# Patient Record
Sex: FEMALE | Race: BLACK OR AFRICAN AMERICAN | Employment: FULL TIME | URBAN - METROPOLITAN AREA
[De-identification: names, ages, dates, MRNs, and addresses within clinical notes are randomized per-mention and may not be internally consistent; named-entity substitution may affect disease eponyms.]

---

## 2018-02-16 ENCOUNTER — APPOINTMENT (OUTPATIENT)
Dept: GENERAL RADIOLOGY | Age: 48
DRG: 191 | End: 2018-02-16
Payer: COMMERCIAL

## 2018-02-16 ENCOUNTER — HOSPITAL ENCOUNTER (INPATIENT)
Age: 48
LOS: 4 days | Discharge: HOME | DRG: 191 | End: 2018-02-20
Attending: EMERGENCY MEDICINE | Admitting: INTERNAL MEDICINE
Payer: COMMERCIAL

## 2018-02-16 DIAGNOSIS — J44.1 COPD EXACERBATION (HCC): ICD-10-CM

## 2018-02-16 DIAGNOSIS — J18.9 PNEUMONIA DUE TO ORGANISM: Primary | ICD-10-CM

## 2018-02-16 LAB
% CKMB: 1.3 % (ref 0–3)
ABSOLUTE EOS #: 0 K/UL (ref 0–0.4)
ABSOLUTE IMMATURE GRANULOCYTE: ABNORMAL K/UL (ref 0–0.3)
ABSOLUTE LYMPH #: 0.4 K/UL (ref 1–4.8)
ABSOLUTE MONO #: 0.8 K/UL (ref 0.2–0.8)
ANION GAP SERPL CALCULATED.3IONS-SCNC: 15 MMOL/L (ref 9–17)
BASOPHILS # BLD: 0 % (ref 0–2)
BASOPHILS ABSOLUTE: 0 K/UL (ref 0–0.2)
BUN BLDV-MCNC: 11 MG/DL (ref 6–20)
BUN/CREAT BLD: 17 (ref 9–20)
CALCIUM SERPL-MCNC: 9 MG/DL (ref 8.6–10.4)
CHLORIDE BLD-SCNC: 97 MMOL/L (ref 98–107)
CK MB: 1.9 NG/ML
CKMB INTERPRETATION: NORMAL
CO2: 24 MMOL/L (ref 20–31)
CREAT SERPL-MCNC: 0.64 MG/DL (ref 0.5–0.9)
DIFFERENTIAL TYPE: ABNORMAL
DIRECT EXAM: NORMAL
EKG ATRIAL RATE: 133 BPM
EKG P AXIS: 25 DEGREES
EKG P-R INTERVAL: 104 MS
EKG Q-T INTERVAL: 390 MS
EKG QRS DURATION: 84 MS
EKG QTC CALCULATION (BAZETT): 580 MS
EKG R AXIS: 57 DEGREES
EKG T AXIS: 12 DEGREES
EKG VENTRICULAR RATE: 133 BPM
EOSINOPHILS RELATIVE PERCENT: 0 % (ref 1–4)
GFR AFRICAN AMERICAN: >60 ML/MIN
GFR NON-AFRICAN AMERICAN: >60 ML/MIN
GFR SERPL CREATININE-BSD FRML MDRD: ABNORMAL ML/MIN/{1.73_M2}
GFR SERPL CREATININE-BSD FRML MDRD: ABNORMAL ML/MIN/{1.73_M2}
GLUCOSE BLD-MCNC: 101 MG/DL (ref 70–99)
HCT VFR BLD CALC: 45.2 % (ref 36–46)
HEMOGLOBIN: 15 G/DL (ref 12–16)
IMMATURE GRANULOCYTES: ABNORMAL %
LACTIC ACID: 1.3 MMOL/L (ref 0.5–2.2)
LYMPHOCYTES # BLD: 4 % (ref 24–44)
Lab: NORMAL
MAGNESIUM: 1.8 MG/DL (ref 1.6–2.6)
MCH RBC QN AUTO: 31.5 PG (ref 26–34)
MCHC RBC AUTO-ENTMCNC: 33.2 G/DL (ref 31–37)
MCV RBC AUTO: 94.8 FL (ref 80–100)
MONOCYTES # BLD: 8 % (ref 1–7)
MYOGLOBIN: 21 NG/ML (ref 25–58)
NRBC AUTOMATED: ABNORMAL PER 100 WBC
PDW BLD-RTO: 13.3 % (ref 11.5–14.5)
PLATELET # BLD: 324 K/UL (ref 130–400)
PLATELET ESTIMATE: ABNORMAL
PMV BLD AUTO: 8.5 FL (ref 6–12)
POC TROPONIN I: 0 NG/ML (ref 0–0.1)
POC TROPONIN INTERP: NORMAL
POTASSIUM SERPL-SCNC: 3.1 MMOL/L (ref 3.7–5.3)
RBC # BLD: 4.77 M/UL (ref 4–5.2)
RBC # BLD: ABNORMAL 10*6/UL
SEG NEUTROPHILS: 88 % (ref 36–66)
SEGMENTED NEUTROPHILS ABSOLUTE COUNT: 8.2 K/UL (ref 1.8–7.7)
SODIUM BLD-SCNC: 136 MMOL/L (ref 135–144)
SPECIMEN DESCRIPTION: NORMAL
STATUS: NORMAL
TOTAL CK: 144 U/L (ref 26–192)
TROPONIN INTERP: ABNORMAL
TROPONIN T: <0.03 NG/ML
WBC # BLD: 9.5 K/UL (ref 3.5–11)
WBC # BLD: ABNORMAL 10*3/UL

## 2018-02-16 PROCEDURE — 6370000000 HC RX 637 (ALT 250 FOR IP): Performed by: EMERGENCY MEDICINE

## 2018-02-16 PROCEDURE — 82550 ASSAY OF CK (CPK): CPT

## 2018-02-16 PROCEDURE — 96376 TX/PRO/DX INJ SAME DRUG ADON: CPT

## 2018-02-16 PROCEDURE — 83874 ASSAY OF MYOGLOBIN: CPT

## 2018-02-16 PROCEDURE — 93005 ELECTROCARDIOGRAM TRACING: CPT

## 2018-02-16 PROCEDURE — 87086 URINE CULTURE/COLONY COUNT: CPT

## 2018-02-16 PROCEDURE — 85025 COMPLETE CBC W/AUTO DIFF WBC: CPT

## 2018-02-16 PROCEDURE — 6360000002 HC RX W HCPCS: Performed by: EMERGENCY MEDICINE

## 2018-02-16 PROCEDURE — 83605 ASSAY OF LACTIC ACID: CPT

## 2018-02-16 PROCEDURE — 2500000003 HC RX 250 WO HCPCS: Performed by: EMERGENCY MEDICINE

## 2018-02-16 PROCEDURE — 84484 ASSAY OF TROPONIN QUANT: CPT

## 2018-02-16 PROCEDURE — 83735 ASSAY OF MAGNESIUM: CPT

## 2018-02-16 PROCEDURE — 82553 CREATINE MB FRACTION: CPT

## 2018-02-16 PROCEDURE — 94640 AIRWAY INHALATION TREATMENT: CPT

## 2018-02-16 PROCEDURE — 2060000000 HC ICU INTERMEDIATE R&B

## 2018-02-16 PROCEDURE — 71045 X-RAY EXAM CHEST 1 VIEW: CPT

## 2018-02-16 PROCEDURE — 80048 BASIC METABOLIC PNL TOTAL CA: CPT

## 2018-02-16 PROCEDURE — 96374 THER/PROPH/DIAG INJ IV PUSH: CPT

## 2018-02-16 PROCEDURE — 96375 TX/PRO/DX INJ NEW DRUG ADDON: CPT

## 2018-02-16 PROCEDURE — 2580000003 HC RX 258: Performed by: EMERGENCY MEDICINE

## 2018-02-16 PROCEDURE — 87040 BLOOD CULTURE FOR BACTERIA: CPT

## 2018-02-16 PROCEDURE — 99285 EMERGENCY DEPT VISIT HI MDM: CPT

## 2018-02-16 PROCEDURE — 87804 INFLUENZA ASSAY W/OPTIC: CPT

## 2018-02-16 RX ORDER — ONDANSETRON 2 MG/ML
4 INJECTION INTRAMUSCULAR; INTRAVENOUS ONCE
Status: COMPLETED | OUTPATIENT
Start: 2018-02-16 | End: 2018-02-16

## 2018-02-16 RX ORDER — 0.9 % SODIUM CHLORIDE 0.9 %
1000 INTRAVENOUS SOLUTION INTRAVENOUS ONCE
Status: COMPLETED | OUTPATIENT
Start: 2018-02-16 | End: 2018-02-16

## 2018-02-16 RX ORDER — RIZATRIPTAN BENZOATE 10 MG/1
10 TABLET ORAL
COMMUNITY

## 2018-02-16 RX ORDER — HYDROCHLOROTHIAZIDE 25 MG/1
25 TABLET ORAL DAILY
COMMUNITY
End: 2022-02-07

## 2018-02-16 RX ORDER — PHENTERMINE HYDROCHLORIDE 37.5 MG/1
37.5 CAPSULE ORAL EVERY MORNING
Status: ON HOLD | COMMUNITY
End: 2018-02-17

## 2018-02-16 RX ORDER — SODIUM CHLORIDE 9 MG/ML
INJECTION, SOLUTION INTRAVENOUS CONTINUOUS
Status: DISCONTINUED | OUTPATIENT
Start: 2018-02-16 | End: 2018-02-17 | Stop reason: DRUGHIGH

## 2018-02-16 RX ORDER — ACETAMINOPHEN 160 MG/5ML
650 SOLUTION ORAL ONCE
Status: COMPLETED | OUTPATIENT
Start: 2018-02-16 | End: 2018-02-16

## 2018-02-16 RX ORDER — METHYLPREDNISOLONE SODIUM SUCCINATE 125 MG/2ML
125 INJECTION, POWDER, LYOPHILIZED, FOR SOLUTION INTRAMUSCULAR; INTRAVENOUS ONCE
Status: COMPLETED | OUTPATIENT
Start: 2018-02-16 | End: 2018-02-16

## 2018-02-16 RX ORDER — LEVALBUTEROL 1.25 MG/.5ML
1.25 SOLUTION, CONCENTRATE RESPIRATORY (INHALATION) ONCE
Status: COMPLETED | OUTPATIENT
Start: 2018-02-16 | End: 2018-02-16

## 2018-02-16 RX ORDER — METOPROLOL SUCCINATE 25 MG/1
25 TABLET, EXTENDED RELEASE ORAL DAILY
COMMUNITY
End: 2022-02-07

## 2018-02-16 RX ORDER — SODIUM CHLORIDE FOR INHALATION 0.9 %
3 VIAL, NEBULIZER (ML) INHALATION ONCE
Status: DISCONTINUED | OUTPATIENT
Start: 2018-02-16 | End: 2018-02-20 | Stop reason: HOSPADM

## 2018-02-16 RX ORDER — METOPROLOL TARTRATE 5 MG/5ML
2.5 INJECTION INTRAVENOUS ONCE
Status: COMPLETED | OUTPATIENT
Start: 2018-02-16 | End: 2018-02-16

## 2018-02-16 RX ORDER — POTASSIUM BICARBONATE 25 MEQ/1
25 TABLET, EFFERVESCENT ORAL ONCE
Status: COMPLETED | OUTPATIENT
Start: 2018-02-16 | End: 2018-02-16

## 2018-02-16 RX ORDER — MORPHINE SULFATE 4 MG/ML
4 INJECTION, SOLUTION INTRAMUSCULAR; INTRAVENOUS ONCE
Status: COMPLETED | OUTPATIENT
Start: 2018-02-16 | End: 2018-02-16

## 2018-02-16 RX ORDER — MORPHINE SULFATE 2 MG/ML
2 INJECTION, SOLUTION INTRAMUSCULAR; INTRAVENOUS ONCE
Status: COMPLETED | OUTPATIENT
Start: 2018-02-16 | End: 2018-02-16

## 2018-02-16 RX ADMIN — METOROPROLOL TARTRATE 2.5 MG: 5 INJECTION, SOLUTION INTRAVENOUS at 19:35

## 2018-02-16 RX ADMIN — ACETAMINOPHEN 650 MG: 325 SOLUTION ORAL at 19:32

## 2018-02-16 RX ADMIN — SODIUM CHLORIDE 1000 ML: 9 INJECTION, SOLUTION INTRAVENOUS at 19:29

## 2018-02-16 RX ADMIN — IPRATROPIUM BROMIDE 0.5 MG: 0.5 SOLUTION RESPIRATORY (INHALATION) at 19:44

## 2018-02-16 RX ADMIN — CEFTRIAXONE SODIUM 1 G: 10 INJECTION, POWDER, FOR SOLUTION INTRAVENOUS at 21:27

## 2018-02-16 RX ADMIN — POTASSIUM BICARBONATE 25 MEQ: 25 TABLET, EFFERVESCENT ORAL at 21:17

## 2018-02-16 RX ADMIN — LEVALBUTEROL 1.25 MG: 1.25 SOLUTION, CONCENTRATE RESPIRATORY (INHALATION) at 19:44

## 2018-02-16 RX ADMIN — SODIUM CHLORIDE: 9 INJECTION, SOLUTION INTRAVENOUS at 19:30

## 2018-02-16 RX ADMIN — MORPHINE SULFATE 4 MG: 4 INJECTION INTRAVENOUS at 19:43

## 2018-02-16 RX ADMIN — AZITHROMYCIN MONOHYDRATE 500 MG: 500 INJECTION, POWDER, LYOPHILIZED, FOR SOLUTION INTRAVENOUS at 21:58

## 2018-02-16 RX ADMIN — METHYLPREDNISOLONE SODIUM SUCCINATE 125 MG: 125 INJECTION, POWDER, FOR SOLUTION INTRAMUSCULAR; INTRAVENOUS at 21:17

## 2018-02-16 RX ADMIN — ONDANSETRON 4 MG: 2 INJECTION INTRAMUSCULAR; INTRAVENOUS at 19:32

## 2018-02-16 RX ADMIN — MORPHINE SULFATE 2 MG: 2 INJECTION, SOLUTION INTRAMUSCULAR; INTRAVENOUS at 21:18

## 2018-02-16 ASSESSMENT — ENCOUNTER SYMPTOMS
NAUSEA: 1
BACK PAIN: 1
CHEST TIGHTNESS: 1
SHORTNESS OF BREATH: 1
WHEEZING: 1
COUGH: 1

## 2018-02-16 ASSESSMENT — PAIN DESCRIPTION - PAIN TYPE
TYPE: ACUTE PAIN

## 2018-02-16 ASSESSMENT — PAIN SCALES - GENERAL
PAINLEVEL_OUTOF10: 10
PAINLEVEL_OUTOF10: 2
PAINLEVEL_OUTOF10: 5
PAINLEVEL_OUTOF10: 7

## 2018-02-16 ASSESSMENT — PAIN DESCRIPTION - LOCATION
LOCATION: BACK;LEG
LOCATION: BACK;LEG
LOCATION: BACK;EAR

## 2018-02-17 ENCOUNTER — APPOINTMENT (OUTPATIENT)
Dept: GENERAL RADIOLOGY | Age: 48
DRG: 191 | End: 2018-02-17
Payer: COMMERCIAL

## 2018-02-17 PROBLEM — E87.6 HYPOKALEMIA: Status: ACTIVE | Noted: 2018-02-17

## 2018-02-17 LAB
ABSOLUTE EOS #: 0 K/UL (ref 0–0.4)
ABSOLUTE IMMATURE GRANULOCYTE: ABNORMAL K/UL (ref 0–0.3)
ABSOLUTE LYMPH #: 0.4 K/UL (ref 1–4.8)
ABSOLUTE MONO #: 0.1 K/UL (ref 0.2–0.8)
ANION GAP SERPL CALCULATED.3IONS-SCNC: 12 MMOL/L (ref 9–17)
BASOPHILS # BLD: 0 % (ref 0–2)
BASOPHILS ABSOLUTE: 0 K/UL (ref 0–0.2)
BUN BLDV-MCNC: 8 MG/DL (ref 6–20)
BUN/CREAT BLD: 13 (ref 9–20)
CALCIUM SERPL-MCNC: 8.6 MG/DL (ref 8.6–10.4)
CHLORIDE BLD-SCNC: 100 MMOL/L (ref 98–107)
CO2: 26 MMOL/L (ref 20–31)
CREAT SERPL-MCNC: 0.61 MG/DL (ref 0.5–0.9)
DIFFERENTIAL TYPE: ABNORMAL
EOSINOPHILS RELATIVE PERCENT: 0 % (ref 1–4)
GFR AFRICAN AMERICAN: >60 ML/MIN
GFR NON-AFRICAN AMERICAN: >60 ML/MIN
GFR SERPL CREATININE-BSD FRML MDRD: ABNORMAL ML/MIN/{1.73_M2}
GFR SERPL CREATININE-BSD FRML MDRD: ABNORMAL ML/MIN/{1.73_M2}
GLUCOSE BLD-MCNC: 129 MG/DL (ref 65–105)
GLUCOSE BLD-MCNC: 137 MG/DL (ref 70–99)
HCT VFR BLD CALC: 43 % (ref 36–46)
HEMOGLOBIN: 14.1 G/DL (ref 12–16)
IMMATURE GRANULOCYTES: ABNORMAL %
LYMPHOCYTES # BLD: 6 % (ref 24–44)
MAGNESIUM: 1.9 MG/DL (ref 1.6–2.6)
MCH RBC QN AUTO: 31.6 PG (ref 26–34)
MCHC RBC AUTO-ENTMCNC: 32.7 G/DL (ref 31–37)
MCV RBC AUTO: 96.5 FL (ref 80–100)
MONOCYTES # BLD: 1 % (ref 1–7)
NRBC AUTOMATED: ABNORMAL PER 100 WBC
PDW BLD-RTO: 13.4 % (ref 11.5–14.5)
PLATELET # BLD: 286 K/UL (ref 130–400)
PLATELET ESTIMATE: ABNORMAL
PMV BLD AUTO: 8.5 FL (ref 6–12)
POTASSIUM SERPL-SCNC: 3.5 MMOL/L (ref 3.7–5.3)
RBC # BLD: 4.45 M/UL (ref 4–5.2)
RBC # BLD: ABNORMAL 10*6/UL
SEG NEUTROPHILS: 93 % (ref 36–66)
SEGMENTED NEUTROPHILS ABSOLUTE COUNT: 6.2 K/UL (ref 1.8–7.7)
SODIUM BLD-SCNC: 138 MMOL/L (ref 135–144)
WBC # BLD: 6.8 K/UL (ref 3.5–11)
WBC # BLD: ABNORMAL 10*3/UL

## 2018-02-17 PROCEDURE — 94760 N-INVAS EAR/PLS OXIMETRY 1: CPT

## 2018-02-17 PROCEDURE — 2500000003 HC RX 250 WO HCPCS: Performed by: INTERNAL MEDICINE

## 2018-02-17 PROCEDURE — 2700000000 HC OXYGEN THERAPY PER DAY

## 2018-02-17 PROCEDURE — 6360000002 HC RX W HCPCS: Performed by: INTERNAL MEDICINE

## 2018-02-17 PROCEDURE — 82947 ASSAY GLUCOSE BLOOD QUANT: CPT

## 2018-02-17 PROCEDURE — 80048 BASIC METABOLIC PNL TOTAL CA: CPT

## 2018-02-17 PROCEDURE — 36415 COLL VENOUS BLD VENIPUNCTURE: CPT

## 2018-02-17 PROCEDURE — 85025 COMPLETE CBC W/AUTO DIFF WBC: CPT

## 2018-02-17 PROCEDURE — 6370000000 HC RX 637 (ALT 250 FOR IP): Performed by: NURSE PRACTITIONER

## 2018-02-17 PROCEDURE — 94640 AIRWAY INHALATION TREATMENT: CPT

## 2018-02-17 PROCEDURE — 83036 HEMOGLOBIN GLYCOSYLATED A1C: CPT

## 2018-02-17 PROCEDURE — 2060000000 HC ICU INTERMEDIATE R&B

## 2018-02-17 PROCEDURE — S0028 INJECTION, FAMOTIDINE, 20 MG: HCPCS | Performed by: INTERNAL MEDICINE

## 2018-02-17 PROCEDURE — 71045 X-RAY EXAM CHEST 1 VIEW: CPT

## 2018-02-17 PROCEDURE — 6370000000 HC RX 637 (ALT 250 FOR IP): Performed by: INTERNAL MEDICINE

## 2018-02-17 PROCEDURE — 83735 ASSAY OF MAGNESIUM: CPT

## 2018-02-17 PROCEDURE — 2580000003 HC RX 258: Performed by: INTERNAL MEDICINE

## 2018-02-17 RX ORDER — ALBUTEROL SULFATE 2.5 MG/3ML
2.5 SOLUTION RESPIRATORY (INHALATION)
Status: DISCONTINUED | OUTPATIENT
Start: 2018-02-17 | End: 2018-02-20 | Stop reason: HOSPADM

## 2018-02-17 RX ORDER — NICOTINE POLACRILEX 4 MG
15 LOZENGE BUCCAL PRN
Status: DISCONTINUED | OUTPATIENT
Start: 2018-02-17 | End: 2018-02-20 | Stop reason: HOSPADM

## 2018-02-17 RX ORDER — PHENTERMINE HYDROCHLORIDE 37.5 MG/1
37.5 TABLET ORAL
COMMUNITY
End: 2022-02-07

## 2018-02-17 RX ORDER — HYDROCHLOROTHIAZIDE 25 MG/1
25 TABLET ORAL DAILY
Status: DISCONTINUED | OUTPATIENT
Start: 2018-02-17 | End: 2018-02-20 | Stop reason: HOSPADM

## 2018-02-17 RX ORDER — SODIUM CHLORIDE 9 MG/ML
INJECTION, SOLUTION INTRAVENOUS CONTINUOUS
Status: DISCONTINUED | OUTPATIENT
Start: 2018-02-17 | End: 2018-02-18

## 2018-02-17 RX ORDER — LORAZEPAM 2 MG/ML
1 INJECTION INTRAMUSCULAR EVERY 6 HOURS PRN
Status: DISCONTINUED | OUTPATIENT
Start: 2018-02-17 | End: 2018-02-20 | Stop reason: HOSPADM

## 2018-02-17 RX ORDER — MAGNESIUM SULFATE 1 G/100ML
1 INJECTION INTRAVENOUS PRN
Status: DISCONTINUED | OUTPATIENT
Start: 2018-02-17 | End: 2018-02-20 | Stop reason: HOSPADM

## 2018-02-17 RX ORDER — METHYLPREDNISOLONE SODIUM SUCCINATE 40 MG/ML
40 INJECTION, POWDER, LYOPHILIZED, FOR SOLUTION INTRAMUSCULAR; INTRAVENOUS EVERY 6 HOURS
Status: DISCONTINUED | OUTPATIENT
Start: 2018-02-17 | End: 2018-02-18

## 2018-02-17 RX ORDER — SODIUM CHLORIDE 0.9 % (FLUSH) 0.9 %
10 SYRINGE (ML) INJECTION PRN
Status: DISCONTINUED | OUTPATIENT
Start: 2018-02-17 | End: 2018-02-20 | Stop reason: HOSPADM

## 2018-02-17 RX ORDER — OMEPRAZOLE 20 MG/1
20 CAPSULE, DELAYED RELEASE ORAL DAILY
COMMUNITY

## 2018-02-17 RX ORDER — ACETAMINOPHEN 325 MG/1
650 TABLET ORAL EVERY 4 HOURS PRN
Status: DISCONTINUED | OUTPATIENT
Start: 2018-02-17 | End: 2018-02-20 | Stop reason: HOSPADM

## 2018-02-17 RX ORDER — POTASSIUM CHLORIDE 7.45 MG/ML
10 INJECTION INTRAVENOUS PRN
Status: DISCONTINUED | OUTPATIENT
Start: 2018-02-17 | End: 2018-02-20 | Stop reason: HOSPADM

## 2018-02-17 RX ORDER — SODIUM CHLORIDE 0.9 % (FLUSH) 0.9 %
10 SYRINGE (ML) INJECTION EVERY 12 HOURS SCHEDULED
Status: DISCONTINUED | OUTPATIENT
Start: 2018-02-17 | End: 2018-02-20 | Stop reason: HOSPADM

## 2018-02-17 RX ORDER — FLUTICASONE PROPIONATE 50 MCG
1 SPRAY, SUSPENSION (ML) NASAL DAILY
Status: DISCONTINUED | OUTPATIENT
Start: 2018-02-17 | End: 2018-02-20 | Stop reason: HOSPADM

## 2018-02-17 RX ORDER — LEVALBUTEROL 1.25 MG/.5ML
1.25 SOLUTION, CONCENTRATE RESPIRATORY (INHALATION) 4 TIMES DAILY
Status: DISCONTINUED | OUTPATIENT
Start: 2018-02-17 | End: 2018-02-20 | Stop reason: HOSPADM

## 2018-02-17 RX ORDER — MORPHINE SULFATE 2 MG/ML
2 INJECTION, SOLUTION INTRAMUSCULAR; INTRAVENOUS
Status: DISCONTINUED | OUTPATIENT
Start: 2018-02-17 | End: 2018-02-20 | Stop reason: HOSPADM

## 2018-02-17 RX ORDER — DEXTROSE MONOHYDRATE 50 MG/ML
100 INJECTION, SOLUTION INTRAVENOUS PRN
Status: DISCONTINUED | OUTPATIENT
Start: 2018-02-17 | End: 2018-02-20 | Stop reason: HOSPADM

## 2018-02-17 RX ORDER — SODIUM CHLORIDE FOR INHALATION 0.9 %
3 VIAL, NEBULIZER (ML) INHALATION 4 TIMES DAILY
Status: DISCONTINUED | OUTPATIENT
Start: 2018-02-17 | End: 2018-02-18

## 2018-02-17 RX ORDER — DEXTROSE MONOHYDRATE 25 G/50ML
12.5 INJECTION, SOLUTION INTRAVENOUS PRN
Status: DISCONTINUED | OUTPATIENT
Start: 2018-02-17 | End: 2018-02-20 | Stop reason: HOSPADM

## 2018-02-17 RX ORDER — MORPHINE SULFATE 4 MG/ML
4 INJECTION, SOLUTION INTRAMUSCULAR; INTRAVENOUS
Status: DISCONTINUED | OUTPATIENT
Start: 2018-02-17 | End: 2018-02-20 | Stop reason: HOSPADM

## 2018-02-17 RX ORDER — POTASSIUM CHLORIDE 20 MEQ/1
20 TABLET, EXTENDED RELEASE ORAL ONCE
Status: COMPLETED | OUTPATIENT
Start: 2018-02-17 | End: 2018-02-17

## 2018-02-17 RX ORDER — METOPROLOL SUCCINATE 25 MG/1
25 TABLET, EXTENDED RELEASE ORAL DAILY
Status: DISCONTINUED | OUTPATIENT
Start: 2018-02-17 | End: 2018-02-20 | Stop reason: HOSPADM

## 2018-02-17 RX ORDER — ONDANSETRON 2 MG/ML
4 INJECTION INTRAMUSCULAR; INTRAVENOUS EVERY 6 HOURS PRN
Status: DISCONTINUED | OUTPATIENT
Start: 2018-02-17 | End: 2018-02-20 | Stop reason: HOSPADM

## 2018-02-17 RX ORDER — METHYLPREDNISOLONE SODIUM SUCCINATE 125 MG/2ML
80 INJECTION, POWDER, LYOPHILIZED, FOR SOLUTION INTRAMUSCULAR; INTRAVENOUS EVERY 6 HOURS
Status: DISCONTINUED | OUTPATIENT
Start: 2018-02-17 | End: 2018-02-17

## 2018-02-17 RX ADMIN — LEVALBUTEROL 1.25 MG: 1.25 SOLUTION, CONCENTRATE RESPIRATORY (INHALATION) at 19:49

## 2018-02-17 RX ADMIN — POTASSIUM CHLORIDE 20 MEQ: 20 TABLET, EXTENDED RELEASE ORAL at 20:33

## 2018-02-17 RX ADMIN — IPRATROPIUM BROMIDE 0.5 MG: 0.5 SOLUTION RESPIRATORY (INHALATION) at 19:49

## 2018-02-17 RX ADMIN — METHYLPREDNISOLONE SODIUM SUCCINATE 40 MG: 40 INJECTION, POWDER, FOR SOLUTION INTRAMUSCULAR; INTRAVENOUS at 15:19

## 2018-02-17 RX ADMIN — FAMOTIDINE 20 MG: 10 INJECTION, SOLUTION INTRAVENOUS at 20:36

## 2018-02-17 RX ADMIN — SODIUM CHLORIDE: 9 INJECTION, SOLUTION INTRAVENOUS at 01:39

## 2018-02-17 RX ADMIN — METHYLPREDNISOLONE SODIUM SUCCINATE 80 MG: 125 INJECTION, POWDER, FOR SOLUTION INTRAMUSCULAR; INTRAVENOUS at 03:00

## 2018-02-17 RX ADMIN — FAMOTIDINE 20 MG: 10 INJECTION, SOLUTION INTRAVENOUS at 01:38

## 2018-02-17 RX ADMIN — METHYLPREDNISOLONE SODIUM SUCCINATE 80 MG: 125 INJECTION, POWDER, FOR SOLUTION INTRAMUSCULAR; INTRAVENOUS at 10:16

## 2018-02-17 RX ADMIN — HYDROCHLOROTHIAZIDE 25 MG: 25 TABLET ORAL at 15:19

## 2018-02-17 RX ADMIN — ACETAMINOPHEN 650 MG: 325 TABLET ORAL at 15:41

## 2018-02-17 RX ADMIN — MOMETASONE FUROATE AND FORMOTEROL FUMARATE DIHYDRATE 2 PUFF: 200; 5 AEROSOL RESPIRATORY (INHALATION) at 19:49

## 2018-02-17 RX ADMIN — METHYLPREDNISOLONE SODIUM SUCCINATE 40 MG: 40 INJECTION, POWDER, FOR SOLUTION INTRAMUSCULAR; INTRAVENOUS at 20:36

## 2018-02-17 RX ADMIN — ENOXAPARIN SODIUM 40 MG: 40 INJECTION SUBCUTANEOUS at 10:16

## 2018-02-17 RX ADMIN — IPRATROPIUM BROMIDE 0.5 MG: 0.5 SOLUTION RESPIRATORY (INHALATION) at 16:41

## 2018-02-17 RX ADMIN — ACETAMINOPHEN 650 MG: 325 TABLET ORAL at 01:38

## 2018-02-17 RX ADMIN — Medication 10 ML: at 09:38

## 2018-02-17 RX ADMIN — IPRATROPIUM BROMIDE 0.5 MG: 0.5 SOLUTION RESPIRATORY (INHALATION) at 11:42

## 2018-02-17 RX ADMIN — LEVALBUTEROL 1.25 MG: 1.25 SOLUTION, CONCENTRATE RESPIRATORY (INHALATION) at 16:40

## 2018-02-17 RX ADMIN — METOPROLOL SUCCINATE 25 MG: 25 TABLET, FILM COATED, EXTENDED RELEASE ORAL at 15:19

## 2018-02-17 RX ADMIN — FLUTICASONE PROPIONATE 1 SPRAY: 50 SPRAY, METERED NASAL at 20:30

## 2018-02-17 RX ADMIN — CEFTRIAXONE SODIUM 1 G: 10 INJECTION, POWDER, FOR SOLUTION INTRAVENOUS at 20:36

## 2018-02-17 RX ADMIN — FAMOTIDINE 20 MG: 10 INJECTION, SOLUTION INTRAVENOUS at 10:16

## 2018-02-17 RX ADMIN — ACETAMINOPHEN 650 MG: 325 TABLET ORAL at 20:33

## 2018-02-17 RX ADMIN — AZITHROMYCIN MONOHYDRATE 500 MG: 500 INJECTION, POWDER, LYOPHILIZED, FOR SOLUTION INTRAVENOUS at 20:36

## 2018-02-17 RX ADMIN — INSULIN LISPRO 1 UNITS: 100 INJECTION, SOLUTION INTRAVENOUS; SUBCUTANEOUS at 20:53

## 2018-02-17 RX ADMIN — LEVALBUTEROL 1.25 MG: 1.25 SOLUTION, CONCENTRATE RESPIRATORY (INHALATION) at 11:42

## 2018-02-17 RX ADMIN — MORPHINE SULFATE 4 MG: 4 INJECTION INTRAVENOUS at 09:38

## 2018-02-17 ASSESSMENT — PAIN SCALES - GENERAL
PAINLEVEL_OUTOF10: 0
PAINLEVEL_OUTOF10: 0
PAINLEVEL_OUTOF10: 8
PAINLEVEL_OUTOF10: 2
PAINLEVEL_OUTOF10: 8
PAINLEVEL_OUTOF10: 0
PAINLEVEL_OUTOF10: 3
PAINLEVEL_OUTOF10: 0
PAINLEVEL_OUTOF10: 4

## 2018-02-17 ASSESSMENT — PAIN DESCRIPTION - LOCATION
LOCATION: HEAD
LOCATION: BACK;HEAD;LEG

## 2018-02-17 ASSESSMENT — PAIN DESCRIPTION - PAIN TYPE
TYPE: ACUTE PAIN
TYPE: ACUTE PAIN

## 2018-02-17 NOTE — H&P
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 with 3 day history of cough congested no phlegm no hemoptysis rated 5/10 worse laying down getting worse with shortness of breath ×3 days continuous rated 6/10 worse with exertion better with rest positive tachypnea occasional wheezing no PND no orthopnea denies any fever occasional chills no chest pains        Past Medical History:   Diagnosis Date    Asthma     Bulging disc     COPD (chronic obstructive pulmonary disease) (HCC)     Migraines     Palpitations          Past Surgical History:   Procedure Laterality Date    HYSTERECTOMY      TUBAL LIGATION         Medications Prior to Admission:    Prescriptions Prior to Admission: hydrochlorothiazide (HYDRODIURIL) 25 MG tablet, Take 25 mg by mouth daily  metoprolol succinate (TOPROL XL) 25 MG extended release tablet, Take 25 mg by mouth daily  phentermine 37.5 MG capsule, Take 37.5 mg by mouth every morning. rizatriptan (MAXALT) 10 MG tablet, Take 10 mg by mouth once as needed for Migraine May repeat in 2 hours if needed  omeprazole (PRILOSEC) 10 MG capsule, Take 10 mg by mouth daily. levalbuterol (XOPENEX) 1.25 MG/0.5ML nebulizer solution, Take 1 ampule by nebulization every 6 hours as needed for Wheezing. budesonide-formoterol (SYMBICORT) 160-4.5 MCG/ACT AERO, Inhale 2 puffs into the lungs 2 times daily. Allergies:    Review of patient's allergies indicates no known allergies. Social History:    reports that she has been smoking Cigarettes. She has been smoking about 0.50 packs per day. She has never used smokeless tobacco. She reports that she does not drink alcohol or use drugs. Family History:   family history is not on file.     REVIEW OF SYSTEMS:  Constitutional: negative, No fever occasionalchills  Eyes: negative  Ears, nose, mouth, throat, and face: negative  Respiratory: CREATININE 0.61 02/17/2018    CALCIUM 8.6 02/17/2018    GFRAA >60 02/17/2018    LABGLOM >60 02/17/2018    GLUCOSE 137 02/17/2018    GLUCOSE 82 03/27/2012         ASSESSMENT:    COPD exacerbation  Pneumonia versus atelectasis  Chronic smoker  Hypokalemia  Thyroid fullness  Acute bronchitis   Patient Active Problem List   Diagnosis    COPD exacerbation (HCC)    Asthma    Tachycardia    SOB (shortness of breath)    COPD (chronic obstructive pulmonary disease) with acute bronchitis (HCC)    Pneumonia    Hypokalemia       PLAN:    Admit pancultured IV antibiotics start IV steroids and bronchodilators oxygen as needed, DVT prophylaxis will check before meals and at bedtime Accu-Cheks with insulin coverage as needed we'll ask pulmonary consultation resume home meds smoking cessation advised options discussed see orders              Jean-Pierre Rasheed MD  12:33 PM  2/17/2018

## 2018-02-17 NOTE — PLAN OF CARE
Problem: Gas Exchange - Impaired:  Goal: Levels of oxygenation will improve  Levels of oxygenation will improve   Outcome: Ongoing  Patient admitted for tx of pneumonia d/t SOB several days running, weakness and fatigue, has dyspnea with exertion. Plan of care for steroids and antibiotics for several days explained to patient, she has no further questions regarding care.

## 2018-02-17 NOTE — ED PROVIDER NOTES
[address] [place] [address] ED  eMERGENCY dEPARTMENT eNCOUnter      Pt Name: Pablo Wilson  MRN: [id number]  [place] 1970  Date of evaluation: 2/16/2018  Provider: Abraham Choi MD    [address] [place] [address]       Chief Complaint   Patient presents with    Shortness of Breath    Extremity Weakness         HISTORY OF PRESENT ILLNESS  (Location/Symptom, Timing/Onset, Context/Setting, Quality, Duration, Modifying Factors, Severity.)   Pablo Wilson is a 52 y.o. female who presents to the emergency department For evaluation of shortness of breath. She has been ill for about 2 days. She's had fevers chills. Last night she became increasingly short of breath. She has back pain. She has diffuse myalgias and arthralgias noted as well. She has been on breathing treatments at home. She does have known history of tachycardia dysrhythmia for which she takes metoprolol. She states she did take her medications today. She has had decreased oral intake. Nursing Notes were reviewed. ALLERGIES     Review of patient's allergies indicates no known allergies. CURRENT MEDICATIONS       Previous Medications    BUDESONIDE-FORMOTEROL (SYMBICORT) 160-4.5 MCG/ACT AERO    Inhale 2 puffs into the lungs 2 times daily. HYDROCHLOROTHIAZIDE (HYDRODIURIL) 25 MG TABLET    Take 25 mg by mouth daily    LEVALBUTEROL (XOPENEX) 1.25 MG/0.5ML NEBULIZER SOLUTION    Take 1 ampule by nebulization every 6 hours as needed for Wheezing. METOPROLOL SUCCINATE (TOPROL XL) 25 MG EXTENDED RELEASE TABLET    Take 25 mg by mouth daily    OMEPRAZOLE (PRILOSEC) 10 MG CAPSULE    Take 10 mg by mouth daily. PHENTERMINE 37.5 MG CAPSULE    Take 37.5 mg by mouth every morning.     RIZATRIPTAN (MAXALT) 10 MG TABLET    Take 10 mg by mouth once as needed for Migraine May repeat in 2 hours if needed       PAST MEDICAL HISTORY         Diagnosis Date    Asthma     Bulging disc     COPD (chronic obstructive pulmonary disease) ([facility] [address]     Migraines     diffuse myalgias. Extremities show no cyanosis clubbing or edema. No calf swelling erythema or asymmetry. Integument without rash or lesion. No neurovascular deficits are noted      DIAGNOSTIC RESULTS     EKG: All EKG's are interpreted by the Emergency Department Physician who either signs or Co-signs this chart in the absence of a cardiologist.    EKG demonstrates sinus tach in the 130s. No gross ischemic change. RADIOLOGY:   Non-plain film images such as CT, Ultrasound and MRI are read by the radiologist. Plain radiographic images are visualized and preliminarily interpreted by the emergency physician with the below findings:    XR CHEST PORTABLE   Status: Final result   Order Providers     Authorizing Billing   MD Beverly Lockett MD          Signed by     Signed Date/Time  Phone Pager   Collin Fuentes 2/16/2018 20:24 [phone]    [facility]     Read Date Phone Pager   Collin Fuentes Feb 16, 2018 [phone]    Narrative   EXAMINATION:   SINGLE VIEW OF THE CHEST       2/16/2018 8:15 pm       COMPARISON:   Radiograph 03/24/2015       HISTORY:   ORDERING SYSTEM PROVIDED HISTORY: SOB   TECHNOLOGIST PROVIDED HISTORY:   Reason for exam:->SOB   Ordering Physician Provided Reason for Exam: SOB   Acuity: Acute   Type of Exam: Initial       FINDINGS:   Cardiomediastinal silhouette is within normal limits.  No pneumothorax or   effusion.  Mild hazy opacities in the lung bases.  No acute osseous   abnormality.           Impression   Mild bibasilar airspace disease could represent atelectasis or pneumonia.                 Interpretation per the Radiologist below, if available at the time of this note:    XR CHEST PORTABLE   Final Result   Mild bibasilar airspace disease could represent atelectasis or pneumonia.                LABS:  Labs Reviewed   BASIC METABOLIC PANEL - Abnormal; Notable for the following:        Result Value    Glucose 101 (*)     Potassium 3.1 ox on room air was 96% without intervention. IV access is established. She is treated symptomatically for her fever and discomfort. She did receive nebulizer treatment as well as IV Solu-Medrol. Investigations reflect pneumonia. On reevaluation she actually has continued wheezing. Her myalgias are improved. Care is discussed with internal medicine to facilitate admission for further care for COPD and pneumonia. CONSULTS:  IP CONSULT TO INTERNAL MEDICINE    PROCEDURES:  None    FINAL IMPRESSION      1. Pneumonia due to organism    2. COPD exacerbation ([facility] [address]          DISPOSITION/PLAN   DISPOSITION Decision To Admit 02/16/2018 07:36:04 PM      PATIENT REFERRED TO:   No follow-up provider specified.     DISCHARGE MEDICATIONS:     New Prescriptions    No medications on file           (Please note that portions of this note were completed with a voice recognition program.  Efforts were made to edit the dictations but occasionally words are mis-transcribed.)    Zunilda Rizvi MD  Attending Emergency Physician         Zunilda Rizvi MD  02/16/18 [id number]

## 2018-02-18 ENCOUNTER — APPOINTMENT (OUTPATIENT)
Dept: GENERAL RADIOLOGY | Age: 48
DRG: 191 | End: 2018-02-18
Payer: COMMERCIAL

## 2018-02-18 PROBLEM — J98.11 PULMONARY ATELECTASIS: Status: ACTIVE | Noted: 2018-02-18

## 2018-02-18 LAB
ABSOLUTE EOS #: 0 K/UL (ref 0–0.4)
ABSOLUTE IMMATURE GRANULOCYTE: ABNORMAL K/UL (ref 0–0.3)
ABSOLUTE LYMPH #: 0.9 K/UL (ref 1–4.8)
ABSOLUTE MONO #: 0.8 K/UL (ref 0.2–0.8)
ANION GAP SERPL CALCULATED.3IONS-SCNC: 11 MMOL/L (ref 9–17)
BASOPHILS # BLD: 0 % (ref 0–2)
BASOPHILS ABSOLUTE: 0 K/UL (ref 0–0.2)
BUN BLDV-MCNC: 9 MG/DL (ref 6–20)
BUN/CREAT BLD: 16 (ref 9–20)
CALCIUM SERPL-MCNC: 8.8 MG/DL (ref 8.6–10.4)
CHLORIDE BLD-SCNC: 103 MMOL/L (ref 98–107)
CO2: 27 MMOL/L (ref 20–31)
CREAT SERPL-MCNC: 0.58 MG/DL (ref 0.5–0.9)
DIFFERENTIAL TYPE: ABNORMAL
EOSINOPHILS RELATIVE PERCENT: 0 % (ref 1–4)
ESTIMATED AVERAGE GLUCOSE: 105 MG/DL
GFR AFRICAN AMERICAN: >60 ML/MIN
GFR NON-AFRICAN AMERICAN: >60 ML/MIN
GFR SERPL CREATININE-BSD FRML MDRD: ABNORMAL ML/MIN/{1.73_M2}
GFR SERPL CREATININE-BSD FRML MDRD: ABNORMAL ML/MIN/{1.73_M2}
GLUCOSE BLD-MCNC: 111 MG/DL (ref 65–105)
GLUCOSE BLD-MCNC: 116 MG/DL (ref 65–105)
GLUCOSE BLD-MCNC: 128 MG/DL (ref 70–99)
GLUCOSE BLD-MCNC: 133 MG/DL (ref 65–105)
GLUCOSE BLD-MCNC: 173 MG/DL (ref 65–105)
GLUCOSE BLD-MCNC: 177 MG/DL (ref 65–105)
HBA1C MFR BLD: 5.3 % (ref 4–6)
HCT VFR BLD CALC: 41 % (ref 36–46)
HEMOGLOBIN: 13.4 G/DL (ref 12–16)
IMMATURE GRANULOCYTES: ABNORMAL %
LYMPHOCYTES # BLD: 8 % (ref 24–44)
MCH RBC QN AUTO: 31.6 PG (ref 26–34)
MCHC RBC AUTO-ENTMCNC: 32.7 G/DL (ref 31–37)
MCV RBC AUTO: 96.8 FL (ref 80–100)
MONOCYTES # BLD: 7 % (ref 1–7)
NRBC AUTOMATED: ABNORMAL PER 100 WBC
PDW BLD-RTO: 13.5 % (ref 11.5–14.5)
PLATELET # BLD: 311 K/UL (ref 130–400)
PLATELET ESTIMATE: ABNORMAL
PMV BLD AUTO: 8.3 FL (ref 6–12)
POTASSIUM SERPL-SCNC: 3.5 MMOL/L (ref 3.7–5.3)
RBC # BLD: 4.23 M/UL (ref 4–5.2)
RBC # BLD: ABNORMAL 10*6/UL
SEG NEUTROPHILS: 85 % (ref 36–66)
SEGMENTED NEUTROPHILS ABSOLUTE COUNT: 8.8 K/UL (ref 1.8–7.7)
SODIUM BLD-SCNC: 141 MMOL/L (ref 135–144)
THYROXINE, FREE: 1.07 NG/DL (ref 0.93–1.7)
TSH SERPL DL<=0.05 MIU/L-ACNC: 0.14 MIU/L (ref 0.3–5)
WBC # BLD: 10.5 K/UL (ref 3.5–11)
WBC # BLD: ABNORMAL 10*3/UL

## 2018-02-18 PROCEDURE — 80048 BASIC METABOLIC PNL TOTAL CA: CPT

## 2018-02-18 PROCEDURE — 84439 ASSAY OF FREE THYROXINE: CPT

## 2018-02-18 PROCEDURE — S0028 INJECTION, FAMOTIDINE, 20 MG: HCPCS | Performed by: INTERNAL MEDICINE

## 2018-02-18 PROCEDURE — 84443 ASSAY THYROID STIM HORMONE: CPT

## 2018-02-18 PROCEDURE — 2060000000 HC ICU INTERMEDIATE R&B

## 2018-02-18 PROCEDURE — 36415 COLL VENOUS BLD VENIPUNCTURE: CPT

## 2018-02-18 PROCEDURE — 6360000002 HC RX W HCPCS: Performed by: INTERNAL MEDICINE

## 2018-02-18 PROCEDURE — 2500000003 HC RX 250 WO HCPCS: Performed by: INTERNAL MEDICINE

## 2018-02-18 PROCEDURE — 6370000000 HC RX 637 (ALT 250 FOR IP): Performed by: INTERNAL MEDICINE

## 2018-02-18 PROCEDURE — 82947 ASSAY GLUCOSE BLOOD QUANT: CPT

## 2018-02-18 PROCEDURE — 2580000003 HC RX 258: Performed by: INTERNAL MEDICINE

## 2018-02-18 PROCEDURE — 71045 X-RAY EXAM CHEST 1 VIEW: CPT

## 2018-02-18 PROCEDURE — 6370000000 HC RX 637 (ALT 250 FOR IP): Performed by: NURSE PRACTITIONER

## 2018-02-18 PROCEDURE — 94640 AIRWAY INHALATION TREATMENT: CPT

## 2018-02-18 PROCEDURE — 85025 COMPLETE CBC W/AUTO DIFF WBC: CPT

## 2018-02-18 RX ORDER — METHYLPREDNISOLONE SODIUM SUCCINATE 40 MG/ML
40 INJECTION, POWDER, LYOPHILIZED, FOR SOLUTION INTRAMUSCULAR; INTRAVENOUS EVERY 8 HOURS
Status: DISCONTINUED | OUTPATIENT
Start: 2018-02-18 | End: 2018-02-18

## 2018-02-18 RX ORDER — METHYLPREDNISOLONE SODIUM SUCCINATE 40 MG/ML
40 INJECTION, POWDER, LYOPHILIZED, FOR SOLUTION INTRAMUSCULAR; INTRAVENOUS EVERY 8 HOURS
Status: COMPLETED | OUTPATIENT
Start: 2018-02-18 | End: 2018-02-18

## 2018-02-18 RX ADMIN — MOMETASONE FUROATE AND FORMOTEROL FUMARATE DIHYDRATE 2 PUFF: 200; 5 AEROSOL RESPIRATORY (INHALATION) at 20:24

## 2018-02-18 RX ADMIN — FAMOTIDINE 20 MG: 10 INJECTION, SOLUTION INTRAVENOUS at 21:00

## 2018-02-18 RX ADMIN — Medication 10 ML: at 21:00

## 2018-02-18 RX ADMIN — FLUTICASONE PROPIONATE 1 SPRAY: 50 SPRAY, METERED NASAL at 10:38

## 2018-02-18 RX ADMIN — METHYLPREDNISOLONE SODIUM SUCCINATE 40 MG: 40 INJECTION, POWDER, FOR SOLUTION INTRAMUSCULAR; INTRAVENOUS at 21:00

## 2018-02-18 RX ADMIN — FAMOTIDINE 20 MG: 10 INJECTION, SOLUTION INTRAVENOUS at 10:38

## 2018-02-18 RX ADMIN — IPRATROPIUM BROMIDE 0.5 MG: 0.5 SOLUTION RESPIRATORY (INHALATION) at 12:03

## 2018-02-18 RX ADMIN — IPRATROPIUM BROMIDE 0.5 MG: 0.5 SOLUTION RESPIRATORY (INHALATION) at 08:22

## 2018-02-18 RX ADMIN — LEVALBUTEROL 1.25 MG: 1.25 SOLUTION, CONCENTRATE RESPIRATORY (INHALATION) at 20:24

## 2018-02-18 RX ADMIN — ENOXAPARIN SODIUM 40 MG: 40 INJECTION SUBCUTANEOUS at 10:38

## 2018-02-18 RX ADMIN — INSULIN LISPRO 1 UNITS: 100 INJECTION, SOLUTION INTRAVENOUS; SUBCUTANEOUS at 21:00

## 2018-02-18 RX ADMIN — HYDROCHLOROTHIAZIDE 25 MG: 25 TABLET ORAL at 10:38

## 2018-02-18 RX ADMIN — IPRATROPIUM BROMIDE 0.5 MG: 0.5 SOLUTION RESPIRATORY (INHALATION) at 15:39

## 2018-02-18 RX ADMIN — METHYLPREDNISOLONE SODIUM SUCCINATE 40 MG: 40 INJECTION, POWDER, FOR SOLUTION INTRAMUSCULAR; INTRAVENOUS at 04:15

## 2018-02-18 RX ADMIN — LEVALBUTEROL 1.25 MG: 1.25 SOLUTION, CONCENTRATE RESPIRATORY (INHALATION) at 12:03

## 2018-02-18 RX ADMIN — Medication 10 ML: at 10:37

## 2018-02-18 RX ADMIN — LEVALBUTEROL 1.25 MG: 1.25 SOLUTION, CONCENTRATE RESPIRATORY (INHALATION) at 15:39

## 2018-02-18 RX ADMIN — ACETAMINOPHEN 650 MG: 325 TABLET ORAL at 09:44

## 2018-02-18 RX ADMIN — IPRATROPIUM BROMIDE 0.5 MG: 0.5 SOLUTION RESPIRATORY (INHALATION) at 20:24

## 2018-02-18 RX ADMIN — METHYLPREDNISOLONE SODIUM SUCCINATE 40 MG: 40 INJECTION, POWDER, FOR SOLUTION INTRAMUSCULAR; INTRAVENOUS at 10:38

## 2018-02-18 RX ADMIN — LEVALBUTEROL 1.25 MG: 1.25 SOLUTION, CONCENTRATE RESPIRATORY (INHALATION) at 08:22

## 2018-02-18 RX ADMIN — METOPROLOL SUCCINATE 25 MG: 25 TABLET, FILM COATED, EXTENDED RELEASE ORAL at 10:38

## 2018-02-18 ASSESSMENT — PAIN SCALES - GENERAL
PAINLEVEL_OUTOF10: 0
PAINLEVEL_OUTOF10: 3

## 2018-02-18 NOTE — PLAN OF CARE
Problem: Gas Exchange - Impaired:  Goal: Levels of oxygenation will improve  Levels of oxygenation will improve   Outcome: Ongoing      Problem: Falls - Risk of  Goal: Absence of falls  Outcome: Ongoing  Fall risk assessment completed. Patient instructed to use call light. Bed locked and in lowest position, side rails up 2/4, call light and bedside table within reach, clutter removed, and non-skid footwear on when pt out of bed. Hourly rounds will continue.

## 2018-02-18 NOTE — PLAN OF CARE
Problem: Gas Exchange - Impaired:  Goal: Levels of oxygenation will improve  Levels of oxygenation will improve   Outcome: Met This Shift  Patient's oxygen 99% on room air, denies SOB, cough noted, IV ABx and Steroids complete this shift. Patient teaching on use of steroids and antibiotics. Will continue to monitor for drop in sats/SOB.

## 2018-02-19 ENCOUNTER — APPOINTMENT (OUTPATIENT)
Dept: ULTRASOUND IMAGING | Age: 48
DRG: 191 | End: 2018-02-19
Payer: COMMERCIAL

## 2018-02-19 LAB
ABSOLUTE EOS #: 0 K/UL (ref 0–0.4)
ABSOLUTE IMMATURE GRANULOCYTE: ABNORMAL K/UL (ref 0–0.3)
ABSOLUTE LYMPH #: 1 K/UL (ref 1–4.8)
ABSOLUTE MONO #: 0.1 K/UL (ref 0.2–0.8)
ANION GAP SERPL CALCULATED.3IONS-SCNC: 13 MMOL/L (ref 9–17)
BASOPHILS # BLD: 0 % (ref 0–2)
BASOPHILS ABSOLUTE: 0 K/UL (ref 0–0.2)
BUN BLDV-MCNC: 10 MG/DL (ref 6–20)
BUN/CREAT BLD: 16 (ref 9–20)
CALCIUM SERPL-MCNC: 8.7 MG/DL (ref 8.6–10.4)
CHLORIDE BLD-SCNC: 99 MMOL/L (ref 98–107)
CO2: 27 MMOL/L (ref 20–31)
CREAT SERPL-MCNC: 0.61 MG/DL (ref 0.5–0.9)
CULTURE: NORMAL
CULTURE: NORMAL
DIFFERENTIAL TYPE: ABNORMAL
EOSINOPHILS RELATIVE PERCENT: 0 % (ref 1–4)
GFR AFRICAN AMERICAN: >60 ML/MIN
GFR NON-AFRICAN AMERICAN: >60 ML/MIN
GFR SERPL CREATININE-BSD FRML MDRD: ABNORMAL ML/MIN/{1.73_M2}
GFR SERPL CREATININE-BSD FRML MDRD: ABNORMAL ML/MIN/{1.73_M2}
GLUCOSE BLD-MCNC: 125 MG/DL (ref 65–105)
GLUCOSE BLD-MCNC: 134 MG/DL (ref 65–105)
GLUCOSE BLD-MCNC: 142 MG/DL (ref 65–105)
GLUCOSE BLD-MCNC: 144 MG/DL (ref 70–99)
GLUCOSE BLD-MCNC: 168 MG/DL (ref 65–105)
HCT VFR BLD CALC: 42 % (ref 36–46)
HEMOGLOBIN: 13.7 G/DL (ref 12–16)
IMMATURE GRANULOCYTES: ABNORMAL %
LV EF: 65 %
LVEF MODALITY: NORMAL
LYMPHOCYTES # BLD: 11 % (ref 24–44)
Lab: NORMAL
MCH RBC QN AUTO: 31.7 PG (ref 26–34)
MCHC RBC AUTO-ENTMCNC: 32.7 G/DL (ref 31–37)
MCV RBC AUTO: 96.9 FL (ref 80–100)
MONOCYTES # BLD: 1 % (ref 1–7)
NRBC AUTOMATED: ABNORMAL PER 100 WBC
PDW BLD-RTO: 13.4 % (ref 11.5–14.5)
PLATELET # BLD: 297 K/UL (ref 130–400)
PLATELET ESTIMATE: ABNORMAL
PMV BLD AUTO: 8.3 FL (ref 6–12)
POTASSIUM SERPL-SCNC: 3.7 MMOL/L (ref 3.7–5.3)
RBC # BLD: 4.34 M/UL (ref 4–5.2)
RBC # BLD: ABNORMAL 10*6/UL
SEG NEUTROPHILS: 88 % (ref 36–66)
SEGMENTED NEUTROPHILS ABSOLUTE COUNT: 7.9 K/UL (ref 1.8–7.7)
SODIUM BLD-SCNC: 139 MMOL/L (ref 135–144)
SPECIMEN DESCRIPTION: NORMAL
SPECIMEN DESCRIPTION: NORMAL
STATUS: NORMAL
WBC # BLD: 9 K/UL (ref 3.5–11)
WBC # BLD: ABNORMAL 10*3/UL

## 2018-02-19 PROCEDURE — 93306 TTE W/DOPPLER COMPLETE: CPT

## 2018-02-19 PROCEDURE — 80048 BASIC METABOLIC PNL TOTAL CA: CPT

## 2018-02-19 PROCEDURE — S0028 INJECTION, FAMOTIDINE, 20 MG: HCPCS | Performed by: INTERNAL MEDICINE

## 2018-02-19 PROCEDURE — 85025 COMPLETE CBC W/AUTO DIFF WBC: CPT

## 2018-02-19 PROCEDURE — 36415 COLL VENOUS BLD VENIPUNCTURE: CPT

## 2018-02-19 PROCEDURE — 6360000002 HC RX W HCPCS: Performed by: INTERNAL MEDICINE

## 2018-02-19 PROCEDURE — 82947 ASSAY GLUCOSE BLOOD QUANT: CPT

## 2018-02-19 PROCEDURE — 94640 AIRWAY INHALATION TREATMENT: CPT

## 2018-02-19 PROCEDURE — 94760 N-INVAS EAR/PLS OXIMETRY 1: CPT

## 2018-02-19 PROCEDURE — 6370000000 HC RX 637 (ALT 250 FOR IP): Performed by: INTERNAL MEDICINE

## 2018-02-19 PROCEDURE — 2060000000 HC ICU INTERMEDIATE R&B

## 2018-02-19 PROCEDURE — 6370000000 HC RX 637 (ALT 250 FOR IP): Performed by: NURSE PRACTITIONER

## 2018-02-19 PROCEDURE — 2500000003 HC RX 250 WO HCPCS: Performed by: INTERNAL MEDICINE

## 2018-02-19 PROCEDURE — 76536 US EXAM OF HEAD AND NECK: CPT

## 2018-02-19 PROCEDURE — 2580000003 HC RX 258: Performed by: INTERNAL MEDICINE

## 2018-02-19 RX ORDER — FAMOTIDINE 20 MG/1
20 TABLET, FILM COATED ORAL 2 TIMES DAILY
Status: DISCONTINUED | OUTPATIENT
Start: 2018-02-19 | End: 2018-02-20 | Stop reason: HOSPADM

## 2018-02-19 RX ORDER — BENZONATATE 100 MG/1
100 CAPSULE ORAL 3 TIMES DAILY
Status: DISCONTINUED | OUTPATIENT
Start: 2018-02-19 | End: 2018-02-20 | Stop reason: HOSPADM

## 2018-02-19 RX ADMIN — INSULIN LISPRO 1 UNITS: 100 INJECTION, SOLUTION INTRAVENOUS; SUBCUTANEOUS at 22:24

## 2018-02-19 RX ADMIN — Medication 10 ML: at 10:04

## 2018-02-19 RX ADMIN — LEVALBUTEROL 1.25 MG: 1.25 SOLUTION, CONCENTRATE RESPIRATORY (INHALATION) at 19:47

## 2018-02-19 RX ADMIN — MOMETASONE FUROATE AND FORMOTEROL FUMARATE DIHYDRATE 2 PUFF: 200; 5 AEROSOL RESPIRATORY (INHALATION) at 07:58

## 2018-02-19 RX ADMIN — MOMETASONE FUROATE AND FORMOTEROL FUMARATE DIHYDRATE 2 PUFF: 200; 5 AEROSOL RESPIRATORY (INHALATION) at 19:47

## 2018-02-19 RX ADMIN — FLUTICASONE PROPIONATE 1 SPRAY: 50 SPRAY, METERED NASAL at 10:00

## 2018-02-19 RX ADMIN — IPRATROPIUM BROMIDE 0.5 MG: 0.5 SOLUTION RESPIRATORY (INHALATION) at 19:47

## 2018-02-19 RX ADMIN — PREDNISONE 30 MG: 20 TABLET ORAL at 10:00

## 2018-02-19 RX ADMIN — ENOXAPARIN SODIUM 40 MG: 40 INJECTION SUBCUTANEOUS at 10:00

## 2018-02-19 RX ADMIN — BENZONATATE 100 MG: 100 CAPSULE ORAL at 21:33

## 2018-02-19 RX ADMIN — IPRATROPIUM BROMIDE 0.5 MG: 0.5 SOLUTION RESPIRATORY (INHALATION) at 15:31

## 2018-02-19 RX ADMIN — FAMOTIDINE 20 MG: 20 TABLET, FILM COATED ORAL at 21:06

## 2018-02-19 RX ADMIN — INSULIN LISPRO 1 UNITS: 100 INJECTION, SOLUTION INTRAVENOUS; SUBCUTANEOUS at 13:32

## 2018-02-19 RX ADMIN — FAMOTIDINE 20 MG: 10 INJECTION, SOLUTION INTRAVENOUS at 10:04

## 2018-02-19 RX ADMIN — LEVALBUTEROL 1.25 MG: 1.25 SOLUTION, CONCENTRATE RESPIRATORY (INHALATION) at 07:58

## 2018-02-19 RX ADMIN — METOPROLOL SUCCINATE 25 MG: 25 TABLET, FILM COATED, EXTENDED RELEASE ORAL at 10:00

## 2018-02-19 RX ADMIN — IPRATROPIUM BROMIDE 0.5 MG: 0.5 SOLUTION RESPIRATORY (INHALATION) at 07:58

## 2018-02-19 RX ADMIN — HYDROCHLOROTHIAZIDE 25 MG: 25 TABLET ORAL at 10:00

## 2018-02-19 RX ADMIN — LEVALBUTEROL 1.25 MG: 1.25 SOLUTION, CONCENTRATE RESPIRATORY (INHALATION) at 15:31

## 2018-02-19 NOTE — PLAN OF CARE
Problem: Gas Exchange - Impaired:  Goal: Levels of oxygenation will improve  Levels of oxygenation will improve   Outcome: Ongoing  Patient saturation remains in good range this shift on room air, less work to breath and good air movement, diminished in bases. Will continue to monitor for low oxygen saturation.

## 2018-02-20 ENCOUNTER — APPOINTMENT (OUTPATIENT)
Dept: GENERAL RADIOLOGY | Age: 48
DRG: 191 | End: 2018-02-20
Payer: COMMERCIAL

## 2018-02-20 VITALS
OXYGEN SATURATION: 95 % | TEMPERATURE: 97.5 F | BODY MASS INDEX: 26.6 KG/M2 | DIASTOLIC BLOOD PRESSURE: 86 MMHG | HEIGHT: 71 IN | SYSTOLIC BLOOD PRESSURE: 141 MMHG | HEART RATE: 99 BPM | RESPIRATION RATE: 18 BRPM | WEIGHT: 190 LBS

## 2018-02-20 PROBLEM — R73.9 HYPERGLYCEMIA: Status: ACTIVE | Noted: 2018-02-20

## 2018-02-20 PROBLEM — J20.9 ACUTE BRONCHITIS: Status: ACTIVE | Noted: 2018-02-20

## 2018-02-20 LAB
GLUCOSE BLD-MCNC: 141 MG/DL (ref 65–105)
GLUCOSE BLD-MCNC: 147 MG/DL (ref 65–105)
GLUCOSE BLD-MCNC: 206 MG/DL (ref 65–105)
GLUCOSE BLD-MCNC: 90 MG/DL (ref 65–105)

## 2018-02-20 PROCEDURE — 82947 ASSAY GLUCOSE BLOOD QUANT: CPT

## 2018-02-20 PROCEDURE — 94640 AIRWAY INHALATION TREATMENT: CPT

## 2018-02-20 PROCEDURE — 6360000002 HC RX W HCPCS: Performed by: INTERNAL MEDICINE

## 2018-02-20 PROCEDURE — 2580000003 HC RX 258: Performed by: INTERNAL MEDICINE

## 2018-02-20 PROCEDURE — 6370000000 HC RX 637 (ALT 250 FOR IP): Performed by: INTERNAL MEDICINE

## 2018-02-20 PROCEDURE — 71046 X-RAY EXAM CHEST 2 VIEWS: CPT

## 2018-02-20 PROCEDURE — 94760 N-INVAS EAR/PLS OXIMETRY 1: CPT

## 2018-02-20 PROCEDURE — 6370000000 HC RX 637 (ALT 250 FOR IP): Performed by: NURSE PRACTITIONER

## 2018-02-20 RX ORDER — PREDNISONE 10 MG/1
50 TABLET ORAL DAILY
Qty: 30 TABLET | Refills: 0 | Status: SHIPPED | OUTPATIENT
Start: 2018-02-20 | End: 2018-02-22

## 2018-02-20 RX ORDER — FLUTICASONE PROPIONATE 50 MCG
1 SPRAY, SUSPENSION (ML) NASAL DAILY
Qty: 1 BOTTLE | Refills: 0 | Status: SHIPPED | OUTPATIENT
Start: 2018-02-21 | End: 2022-02-07

## 2018-02-20 RX ORDER — BENZONATATE 100 MG/1
100 CAPSULE ORAL 3 TIMES DAILY
Qty: 45 CAPSULE | Refills: 0 | Status: SHIPPED | OUTPATIENT
Start: 2018-02-20 | End: 2018-02-27

## 2018-02-20 RX ORDER — AZITHROMYCIN 250 MG/1
250 TABLET, FILM COATED ORAL DAILY
Qty: 10 TABLET | Refills: 0 | Status: SHIPPED | OUTPATIENT
Start: 2018-02-20 | End: 2018-03-02

## 2018-02-20 RX ADMIN — FLUTICASONE PROPIONATE 1 SPRAY: 50 SPRAY, METERED NASAL at 09:24

## 2018-02-20 RX ADMIN — IPRATROPIUM BROMIDE 0.5 MG: 0.5 SOLUTION RESPIRATORY (INHALATION) at 19:30

## 2018-02-20 RX ADMIN — LEVALBUTEROL 1.25 MG: 1.25 SOLUTION, CONCENTRATE RESPIRATORY (INHALATION) at 11:14

## 2018-02-20 RX ADMIN — LEVALBUTEROL 1.25 MG: 1.25 SOLUTION, CONCENTRATE RESPIRATORY (INHALATION) at 15:43

## 2018-02-20 RX ADMIN — Medication 10 ML: at 09:30

## 2018-02-20 RX ADMIN — METOPROLOL SUCCINATE 25 MG: 25 TABLET, FILM COATED, EXTENDED RELEASE ORAL at 09:25

## 2018-02-20 RX ADMIN — PREDNISONE 50 MG: 20 TABLET ORAL at 09:25

## 2018-02-20 RX ADMIN — BENZONATATE 100 MG: 100 CAPSULE ORAL at 09:24

## 2018-02-20 RX ADMIN — LEVALBUTEROL 1.25 MG: 1.25 SOLUTION, CONCENTRATE RESPIRATORY (INHALATION) at 19:30

## 2018-02-20 RX ADMIN — IPRATROPIUM BROMIDE 0.5 MG: 0.5 SOLUTION RESPIRATORY (INHALATION) at 11:14

## 2018-02-20 RX ADMIN — MOMETASONE FUROATE AND FORMOTEROL FUMARATE DIHYDRATE 2 PUFF: 200; 5 AEROSOL RESPIRATORY (INHALATION) at 19:30

## 2018-02-20 RX ADMIN — ENOXAPARIN SODIUM 40 MG: 40 INJECTION SUBCUTANEOUS at 09:25

## 2018-02-20 RX ADMIN — FAMOTIDINE 20 MG: 20 TABLET, FILM COATED ORAL at 09:25

## 2018-02-20 RX ADMIN — MOMETASONE FUROATE AND FORMOTEROL FUMARATE DIHYDRATE 2 PUFF: 200; 5 AEROSOL RESPIRATORY (INHALATION) at 07:17

## 2018-02-20 RX ADMIN — HYDROCHLOROTHIAZIDE 25 MG: 25 TABLET ORAL at 09:25

## 2018-02-20 RX ADMIN — IPRATROPIUM BROMIDE 0.5 MG: 0.5 SOLUTION RESPIRATORY (INHALATION) at 15:43

## 2018-02-20 RX ADMIN — LEVALBUTEROL 1.25 MG: 1.25 SOLUTION, CONCENTRATE RESPIRATORY (INHALATION) at 07:17

## 2018-02-20 RX ADMIN — IPRATROPIUM BROMIDE 0.5 MG: 0.5 SOLUTION RESPIRATORY (INHALATION) at 07:17

## 2018-02-20 NOTE — DISCHARGE SUMMARY
Physician Discharge Summary     Patient ID:  Taqueria Ledezma  [id number]  52 y.o.  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 with shortness of breath cough and wheezing and IV steroids and IV antibiotics bronchodilators were given insulin coverage was ordered to the patient did fairly well had pulmonary consultation no major complications during her stay had a thyroid ultrasound which showed a subcentimeter nodule advised to follow-up within 6 months to 12 PCP and hypokalemia was resolved with the K supplements    Discharge Exam:  See progress note from today    Disposition: home  Stable improved  Patient Instructions:   Current Discharge Medication List   Patient was discharged on a taper steroids to the Tessalon Perles and Z-Axel better pulmonary    START taking these medications    Details   fluticasone (FLONASE) 50 MCG/ACT nasal spray 1 spray by Nasal route daily  Qty: 1 Bottle, Refills: 0         CONTINUE these medications which have NOT CHANGED    Details   omeprazole (PRILOSEC) 20 MG delayed release capsule Take 20 mg by mouth daily      phentermine (ADIPEX-P) 37.5 MG tablet Take 37.5 mg by mouth every morning (before breakfast).       hydrochlorothiazide (HYDRODIURIL) 25 MG tablet Take 25 mg by mouth daily      metoprolol succinate (TOPROL XL) 25 MG extended release tablet Take 25 mg by mouth daily      rizatriptan (MAXALT) 10 MG tablet Take 10 mg by mouth once as needed for Migraine May repeat in 2 hours if needed

## 2018-02-21 NOTE — PROGRESS NOTES
Discharge teaching and instructions completed with patient using teachback method. AVS reviewed. Printed prescriptions given to patient. Patient voiced understanding regarding prescriptions, follow up appointments, and care of self at home. Discharged home with belongings. All questions answered.
Patient given K 20 mEq x1 PO per telephone order from Dr. Carlos Brunson, IV ABx and Steroid continued this shift, patient is without adverse reactions from medications, breathing better, will continue to monitor.
Pharmacy Accuracy Service Medication History Note    The patient's list of current home medications has been reviewed. Source(s) of information: Patient RX bottles    Based on information provided by the above source(s), I have updated the patient's home med list as described below. I changed or updated the following medications on the patient's home medication list:  Adjusted   · Prilosec 10mg adjusted to 20mg daily  · Phentermine adjusted to a tablet (not capsule)       Please feel free to call me with any questions about this encounter. Thank you.     Jessa Oglesby PharmD  Pharmacy Medication Accuracy Review Service  Phone:  [phone]  Fax: [phone]      Electronically signed by GADIEL Faith [facility] on 2/17/2018 at 6:33 PM
Pulmonary Critical Care Progress Note       Patient seen for the follow up of COPD exacerbation    Subjective:  Her shortness of breath has improved. She has improved dry cough. She has less wheezing. No significant chest pain. She has good appetite        Examination:  Vitals: /86   Pulse 103   Temp 98 °F (36.7 °C) (Oral)   Resp 24   Ht 5' 11\" (1.803 m)   Wt 190 lb (86.2 kg)   LMP 06/01/2014   SpO2 100%   BMI 26.50 kg/m²     General appearance: alert and cooperative with exam  Neck: No JVD  Lungs: Decreased breath sounds mild wheezing  Heart: regular rate and rhythm, S1, S2 normal, no gallop  Abdomen: Soft, non tender, + BS  Extremities: no cyanosis or clubbing. No significant edema    LABs:  CBC:   Recent Labs      02/18/18   0623  02/19/18   0629   WBC  10.5  9.0   HGB  13.4  13.7   HCT  41.0  42.0   PLT  311  297     BMP:   Recent Labs      02/18/18   0623  02/19/18   0629   NA  141  139   K  3.5*  3.7   CO2  27  27   BUN  9  10   CREATININE  0.58  0.61   LABGLOM  >60  >60   GLUCOSE  128*  144*     Radiology:  2/18/18  There is elevation of the left hemidiaphragm   with bibasilar atelectasis, grossly unchanged from the prior study.  No   pneumothorax or free air.                Impression:  · Acute exacerbation of Asthma  · Acute Tracheo-bronchitis  · Obesity/Obstructive sleep apnea with noncompliance        Recommendations:    · Xopenex and Ipratropium Q 4 hours and prn  · Dulera 200  · Prednisone 50 mg by mouth daily  · Tessalon Perles 100 3 times a day  · Add zithromax 500 IV QD  · repeat CXR  · Flonase  · DVT prophylaxis with low molecular weight heparin  · CPAP re-titration studies as outpatient  · Discharge planning      Glenda Da Silva MD on 2/20/2018 at 5:45 PM  Pulmonary Critical Care and Sleep Medicine,  [facility]: [phone]
Pulmonary Critical Care Progress Note       Patient seen for the follow up of COPD exacerbation    Subjective:  Her shortness of breath has improved. She has persistent moderate dry cough. She has some wheezing. She feels she is getting better. .  No significant chest pain. She lives in [facility] and is here to visit her mom    Examination:  Vitals: BP (!) 155/87   Pulse 84   Temp 98.4 °F (36.9 °C) (Oral)   Resp 16   Ht 5' 11\" (1.803 m)   Wt 190 lb (86.2 kg)   LMP 06/01/2014   SpO2 94%   BMI 26.50 kg/m²     General appearance: alert and cooperative with exam  Neck: No JVD  Lungs: Decreased breath sounds mild wheezing  Heart: regular rate and rhythm, S1, S2 normal, no gallop  Abdomen: Soft, non tender, + BS  Extremities: no cyanosis or clubbing.  No significant edema    LABs:  CBC:   Recent Labs      02/18/18   0623  02/19/18   0629   WBC  10.5  9.0   HGB  13.4  13.7   HCT  41.0  42.0   PLT  311  297     BMP:   Recent Labs      02/18/18   0623  02/19/18   0629   NA  141  139   K  3.5*  3.7   CO2  27  27   BUN  9  10   CREATININE  0.58  0.61   LABGLOM  >60  >60   GLUCOSE  128*  144*     Radiology:  2/18/18      Impression:  · Acute exacerbation of Asthma  · Acute Tracheo-bronchitis  · Obesity/Obstructive sleep apnea with noncompliance  · Hypokalemia    Recommendations:    · Xopenex and Ipratropium Q 4 hours and prn  · Dulera 200  · Increase prednisone from 30-50 mg by mouth daily  · Tessalon Perles 100 3 times a day  · Flonase  · Potassium replacement  · DVT prophylaxis with low molecular weight heparin  · CPAP re-titration studies as outpatient  · Discharge Christy England MD on 2/19/2018 at 6:26 PM  Pulmonary Critical Care and Sleep Medicine,  [facility] [place]: [phone]
Subjective:     Follow-up COPD doing much better less shortness of breath except with exertion some cough some wheezing ROS  No fever no chills no GI/ complaints no cardiac complaints no TIA no bleeding, no polyuria no polydipsia no hypoglycemia  physical exam  General Appearance: alert and oriented to person, place and time, well-developed and well-nourished, in no acute distress, alert and oriented to person, place and time and in no acute distress  Skin: warm and dry, no rash or erythema  Head: normocephalic and atraumatic  Eyes: pupils equal, round, and reactive to light, conjunctivae normal and sclera anicteric    Neck: neck supple and non tender without mass   Pulmonary/Chest: Air entry equal bilateral rhonchi and expiratory wheezing no crackles no use of intercostals  Cardiovascular: normal rate, regular rhythm, normal S1 and S2, no gallops, intact distal pulses and no carotid bruits  Abdomen: soft, non-tender, non-distended, normal bowel sounds, no masses or organomegaly  Extremities: no edema and good pulses no Evy sign        Neurologic: Alert and oriented ×3 with no focal deficit    BP (!) 144/79   Pulse 92   Temp 97.9 °F (36.6 °C) (Oral)   Resp 16   Ht 5' 11\" (1.803 m)   Wt 190 lb (86.2 kg)   LMP 06/01/2014   SpO2 97%   BMI 26.50 kg/m²     CBC:   Lab Results   Component Value Date    WBC 9.0 02/19/2018    RBC 4.34 02/19/2018    HGB 13.7 02/19/2018    HCT 42.0 02/19/2018    MCV 96.9 02/19/2018    MCH 31.7 02/19/2018    MCHC 32.7 02/19/2018    RDW 13.4 02/19/2018     02/19/2018    MPV 8.3 02/19/2018     CMP:    Lab Results   Component Value Date     02/19/2018    K 3.7 02/19/2018    CL 99 02/19/2018    CO2 27 02/19/2018    BUN 10 02/19/2018    CREATININE 0.61 02/19/2018    GFRAA >60 02/19/2018    LABGLOM >60 02/19/2018    GLUCOSE 144 02/19/2018    GLUCOSE 82 03/27/2012    PROT 7.1 08/21/2015    LABALBU 4.1 08/21/2015    CALCIUM 8.7 02/19/2018    BILITOT 0.37 08/21/2015    ALKPHOS 61
air exchange. Plan is to taper IV steroids to oral in the morning. Continue bronchodilators. Patient will benefit from reevaluation of her sleep apnea. Patient to follow with pulmonology in [place] where she has moved to. Above was reviewed and discussed with Chidi Vega CNP. And I agree with assessment and plan of care.   Electronically signed by     Juan Carlos Hatch MD on 2/18/2018 at 6:34 PM  Pulmonary Critical Care and Sleep Medicine,  [facility]  Cell: [phone]  Office: [phone]

## 2018-02-21 NOTE — DISCHARGE INSTR - DIET

## 2018-02-22 LAB
CULTURE: NORMAL
Lab: NORMAL
Lab: NORMAL
SPECIMEN DESCRIPTION: NORMAL
STATUS: NORMAL
STATUS: NORMAL

## 2022-02-07 ENCOUNTER — HOSPITAL ENCOUNTER (OUTPATIENT)
Dept: PREADMISSION TESTING | Age: 52
Discharge: HOME | End: 2022-02-11
Payer: MEDICARE

## 2022-02-07 VITALS
HEART RATE: 84 BPM | OXYGEN SATURATION: 99 % | SYSTOLIC BLOOD PRESSURE: 138 MMHG | WEIGHT: 211.64 LBS | RESPIRATION RATE: 16 BRPM | DIASTOLIC BLOOD PRESSURE: 76 MMHG | BODY MASS INDEX: 39.96 KG/M2 | TEMPERATURE: 98.2 F | HEIGHT: 61 IN

## 2022-02-07 LAB
ABSOLUTE EOS #: 0.05 K/UL (ref 0–0.44)
ABSOLUTE IMMATURE GRANULOCYTE: 0.02 K/UL (ref 0–0.3)
ABSOLUTE LYMPH #: 3.06 K/UL (ref 1.1–3.7)
ABSOLUTE MONO #: 0.77 K/UL (ref 0.1–1.2)
ANION GAP SERPL CALCULATED.3IONS-SCNC: 13 MMOL/L (ref 9–17)
BASOPHILS # BLD: 1 % (ref 0–2)
BASOPHILS ABSOLUTE: 0.07 K/UL (ref 0–0.2)
BILIRUBIN URINE: NEGATIVE
BUN BLDV-MCNC: 8 MG/DL (ref 6–20)
BUN/CREAT BLD: 11 (ref 9–20)
CALCIUM SERPL-MCNC: 9.1 MG/DL (ref 8.6–10.4)
CHLORIDE BLD-SCNC: 106 MMOL/L (ref 98–107)
CO2: 18 MMOL/L (ref 20–31)
COLOR: YELLOW
COMMENT UA: NORMAL
CREAT SERPL-MCNC: 0.76 MG/DL (ref 0.5–0.9)
DIFFERENTIAL TYPE: NORMAL
EOSINOPHILS RELATIVE PERCENT: 1 % (ref 1–4)
GFR AFRICAN AMERICAN: >60 ML/MIN
GFR NON-AFRICAN AMERICAN: >60 ML/MIN
GFR SERPL CREATININE-BSD FRML MDRD: ABNORMAL ML/MIN/{1.73_M2}
GFR SERPL CREATININE-BSD FRML MDRD: ABNORMAL ML/MIN/{1.73_M2}
GLUCOSE BLD-MCNC: 88 MG/DL (ref 70–99)
GLUCOSE URINE: NEGATIVE
HCT VFR BLD CALC: 42.2 % (ref 36.3–47.1)
HEMOGLOBIN: 14.5 G/DL (ref 11.9–15.1)
IMMATURE GRANULOCYTES: 0 %
INR BLD: 1
KETONES, URINE: NEGATIVE
LEUKOCYTE ESTERASE, URINE: NEGATIVE
LYMPHOCYTES # BLD: 43 % (ref 24–43)
MCH RBC QN AUTO: 32.2 PG (ref 25.2–33.5)
MCHC RBC AUTO-ENTMCNC: 34.4 G/DL (ref 28.4–34.8)
MCV RBC AUTO: 93.6 FL (ref 82.6–102.9)
MONOCYTES # BLD: 11 % (ref 3–12)
NITRITE, URINE: NEGATIVE
NRBC AUTOMATED: 0 PER 100 WBC
PARTIAL THROMBOPLASTIN TIME: 30.2 SEC (ref 23.9–33.8)
PDW BLD-RTO: 12.5 % (ref 11.8–14.4)
PH UA: 6.5 (ref 5–8)
PLATELET # BLD: 294 K/UL (ref 138–453)
PLATELET ESTIMATE: NORMAL
PMV BLD AUTO: 9.6 FL (ref 8.1–13.5)
POTASSIUM SERPL-SCNC: 3.7 MMOL/L (ref 3.7–5.3)
PROTEIN UA: NEGATIVE
PROTHROMBIN TIME: 13.5 SEC (ref 11.5–14.2)
RBC # BLD: 4.51 M/UL (ref 3.95–5.11)
RBC # BLD: NORMAL 10*6/UL
SEG NEUTROPHILS: 44 % (ref 36–65)
SEGMENTED NEUTROPHILS ABSOLUTE COUNT: 3.2 K/UL (ref 1.5–8.1)
SODIUM BLD-SCNC: 137 MMOL/L (ref 135–144)
SPECIFIC GRAVITY UA: 1.02 (ref 1–1.03)
TURBIDITY: CLEAR
URINE HGB: NEGATIVE
UROBILINOGEN, URINE: NORMAL
WBC # BLD: 7.2 K/UL (ref 3.5–11.3)
WBC # BLD: NORMAL 10*3/UL

## 2022-02-07 PROCEDURE — 85025 COMPLETE CBC W/AUTO DIFF WBC: CPT

## 2022-02-07 PROCEDURE — 85610 PROTHROMBIN TIME: CPT

## 2022-02-07 PROCEDURE — 81003 URINALYSIS AUTO W/O SCOPE: CPT

## 2022-02-07 PROCEDURE — 80048 BASIC METABOLIC PNL TOTAL CA: CPT

## 2022-02-07 PROCEDURE — 87086 URINE CULTURE/COLONY COUNT: CPT

## 2022-02-07 PROCEDURE — 36415 COLL VENOUS BLD VENIPUNCTURE: CPT

## 2022-02-07 PROCEDURE — 85730 THROMBOPLASTIN TIME PARTIAL: CPT

## 2022-02-07 PROCEDURE — 87641 MR-STAPH DNA AMP PROBE: CPT

## 2022-02-07 RX ORDER — ERGOCALCIFEROL 1.25 MG/1
50000 CAPSULE ORAL WEEKLY
COMMUNITY

## 2022-02-07 RX ORDER — METOPROLOL TARTRATE 50 MG/1
50 TABLET, FILM COATED ORAL 2 TIMES DAILY
COMMUNITY

## 2022-02-07 RX ORDER — TRAZODONE HYDROCHLORIDE 100 MG/1
100 TABLET ORAL NIGHTLY
COMMUNITY

## 2022-02-07 RX ORDER — VALSARTAN 80 MG/1
80 TABLET ORAL DAILY
COMMUNITY

## 2022-02-07 RX ORDER — CITALOPRAM 10 MG/1
10 TABLET ORAL DAILY
COMMUNITY

## 2022-02-07 RX ORDER — MONTELUKAST SODIUM 10 MG/1
10 TABLET ORAL NIGHTLY
COMMUNITY

## 2022-02-07 RX ORDER — TOPIRAMATE 50 MG/1
50 TABLET, FILM COATED ORAL 2 TIMES DAILY
COMMUNITY

## 2022-02-07 RX ORDER — TIZANIDINE 4 MG/1
4 TABLET ORAL EVERY 8 HOURS PRN
COMMUNITY

## 2022-02-07 NOTE — PRE-PROCEDURE INSTRUCTIONS
ARRIVE  Manchester Marcos Monday Feb 28,2022 at 05:45 am    Once you enter the hospital lobby, take the elevators to the second floor. Check-In is at the surgery registration desk. Continue to take your home medications as you normally do up to and including the night before surgery with the exception of any blood thinning medications. Please stop any blood thinning medications as directed by your surgeon or prescribing physician. Failure to stop certain medications may interfere with your scheduled surgery. These may include:  Aspirin, Warfarin (Coumadin), Clopidogrel (Plavix), Ibuprofen (Motrin, Advil), Naproxen (Aleve), Meloxicam (Mobic), Celecoxib (Celebrex), Eliquis, Pradaxa, Xarelto, Effient, Fish Oil, Herbal supplements. Stop ibuprofen 7 days before surgery  Tylenol is okay to take up to surgery    Please take the following medication(s) the day of surgery with a small sip of water:  Metoprolol,omeprazole,topiramate    Please use your inhaler(s) if needed and bring your inhaler(s) from home the day of surgery. PREPARING FOR YOUR SURGERY:     Before surgery, you can play an important role in your own health. Because skin is not sterile, we need to be sure that your skin is as free of germs as possible before surgery by carefully washing before surgery. Preparing or prepping skin before surgery can reduce the risk of a surgical site infection.   Do not shave the area of your body where your surgery will be performed unless you received specific permission from your physician. You will need to shower at home the night before surgery and the morning of surgery with a special soap called chlorhexidine gluconate (CHG*). *Not to be used by people allergic to Chlorhexidine Gluconate (CHG). Following these instructions will help you be sure that your skin is clean before surgery. Instructions on cleaning your skin before surgery:      The night before your surgery:      You will need to shower with warm water (not hot) and the CHG soap.  Use a clean wash cloth and a clean towel. Have clean clothes available to put on after the shower.   First wash your hair with regular shampoo. Rinse your hair and body thoroughly to remove the shampoo.  Wash your face and genital area (private parts) with your regular soap or water only. Thoroughly rinse your body with warm water from the neck down.  Turn water off to prevent rinsing the soap off too soon.  With a clean wet washcloth and half of the CHG soap in the bottle, lather your entire body from the neck down. Do not use CHG soap near your eyes or ears to avoid injury to those areas.  Wash thoroughly, paying special attention to the area where your surgery will be performed.  Wash your body gently for five (5) minutes. Avoid scrubbing your skin too hard.  Turn the water back on and rinse your body thoroughly.  Pat yourself dry with a clean, soft towel. Do not apply lotion, cream or powder.  Dress with clean freshly washed clothes. The morning of surgery:     Repeat shower following steps above - using remaining half of CHG soap in bottle. Patient Instructions:    Edyta Mcneil If you are having any type of anesthesia you are to have nothing to eat or drink after midnight the night before your surgery. This includes gum, hard candy, mints, water or smoking or chewing tobacco.  The only exception to this is a small sip of water to take with any morning dose of heart, blood pressure, or seizure medications. No alcoholic beverages for 24 hours prior to surgery.  Brush your teeth but do not swallow water.  Bring your eyeglasses and case with you. No contacts are to be worn the day of surgery. You also may bring your hearing aids. Most surgical procedures involving anesthesia will require that you remove your dentures prior to surgery.      If you are on C-PAP or Bi-PAP at home and plan on staying in the hospital overnight for your surgery please bring the machine with you. · Do not wear any jewelry or body piercings day of surgery. Also, NO lotion, perfume or deodorant to be used the day of surgery. No nail polish on the operative extremity (arm/leg surgeries)    · If you are staying overnight with us, please bring a small bag of necessary personal items.  Please wear loose, comfortable clothing. If you are potentially going to have a cast or brace bring clothing that will fit over them.  In case of illness - If you have cold or flu like symptoms (high fever, runny nose, sore throat, cough, etc.) rash, nausea, vomiting, loose stools, and/or recent contact with someone who has a contagious disease (chicken pox, measles, etc.) Please call your doctor before coming to the hospital.         Day of Surgery/Procedure:    As a patient at [facility] [place] you can expect quality medical and nursing care that is centered on your individual needs. Our goal is to make your surgical experience as comfortable as possible    . Transportation After Your Surgery/Procedure: You will need a friend or family member to drive you home after your procedure. Your  must be 25years of age or older and able to sign off on your discharge instructions. A taxi cab or any other form of public transportation is not acceptable. Your friend or family member must stay at the hospital throughout your procedure. Someone must remain with you for the first 24 hours after your surgery if you receive anesthesia or medication. If you do not have someone to stay with you, your procedure may be cancelled.       If you have any other questions regarding your procedure or the day of surgery, please call [phone]      _________________________  ____________________________  Signature (Patient) Signature (Provider) & date

## 2022-02-07 NOTE — H&P (VIEW-ONLY)
History and Physical Service   [facility] - INPATIENT    HISTORY AND PHYSICAL EXAMINATION            Date of Evaluation: 2/7/2022  Patient name:  Sharifa Wheeelr  MRN:   [id number]  YOB: 1970  PCP:    Josie Dominguez MD    History Obtained From:     Patient, medical records    History of Present Illness: This is Sharifa Wheeler a 46 y.o. female who presents for a pre-admission testing appointment for an upcoming right L3-4 TLIF by Dr. Andrew Riojas scheduled on 2/28/2022 at 0730 due to lumbar spondylolisthesis. The patient's chief complaint is 6-10/10 low back pain that has progressively worsened over the past many years. Low back pain is aggravated by standing and sitting long periods and is minimally relieved with rest. Patient has numbness and tingling down the right leg. Prior treatment includes physical therapy and injections. Patient fell 1 week ago in the snow - denies loss of consciousness or hitting her head. Patient states she injured her back. History of COPD, hypertension, and palpitations. Patient was evaluated by Dr. Chiki Pham for pre-operative clearance who ordered stress test, EKG, echocardiogram, and holter monitor. Patient states she has an appointment to review test results on 2/16/2022 for surgical clearance. Denies chest pain. Patient has shortness of breath with exertion and occasional at rest and she has occasional palpitations. Lexiscan stress test 1/21/2022 (full report in short chart):  A review of reconstructed SPECT images shows a small fixed perfusion defect involving the anteroseptal wall segments. Given the patient's body habitus, breast attenuation artifact may contribute to this finding. A small reversible perfusion defect involving the anteroseptal wall segment. This involves less than 50% of the wall segment and is of low suspicion for ischemia. Myocardial perfusion appeared unremarkable elsewhere. Note is made of enlarged LV cavity on stress images. Overall quality of the study is poor. Gated SPECT wall motion analysis demonstrates normal myocardial thickening throughout the left ventricle. The left ventricular ejection fraction is calculated at 69%. 1. Myocardial perfusion imaging is abnormal.  2. No definite evidence of ischemia. 3. Normal gated SPECT. 4. EKG portion - negative for ischemia. 5. Stress myocardial imaging is low to medium risk for ischemia. Transthoracic echocardiography 1/21/2022 (full report in short chart):  1. Left ventricle: There is mild concentric left ventricular hypertrophy. No PARMJIT or LVOT obstruction. 2. Left ventricle: Global systolic function: Overall left ventricular systolic function is normal with an estimated ejection fraction of 55-60%. 3. Left ventricle: The diastolic function of the left ventricle is within normal limits. 4. Right ventricle: The right ventricular size is normal. Right ventricular systolic function is normal.   5. Mitral valve: There is trivial mitral regurgitation present. 6. Tricuspid valve: The peak velocity of tricuspid regurgitant is 2.22m/s with peak gradient of 19.64mmHg. There is no evidence of pulmonary hypertension with estimated right ventricular/pulmonary artery systolic pressure of 67.81 mmHg. 7. Pericardium: There is no pericardial effusion present. Functional Capacity per pt:   1) Pt is not able to walk 2 city blocks on level ground without SOB. 2) Pt is not able to climb 2 flights of stairs without SOB. 3) Pt is not able to walk up a hill for 1-2 city blocks without SOB.     Past Medical History:     Past Medical History:   Diagnosis Date    Anxiety     Arthritis     osteoarthritis    Asthma     Bulging disc     Colon polyp     COPD (chronic obstructive pulmonary disease) ([facility] [address]     Depression     Heartburn     Hypertension     Migraines     Palpitations     Sleep apnea     has a cpap mask but doesn't wear    Tachycardia     Vitamin D deficiency Past Surgical History:     Past Surgical History:   Procedure Laterality Date    COLONOSCOPY      HYSTERECTOMY      TUBAL LIGATION          Medications Prior to Admission:     Prior to Admission medications    Medication Sig Start Date End Date Taking? Authorizing Provider   valsartan (DIOVAN) 80 MG tablet Take 80 mg by mouth daily   Yes Historical Provider, MD   metoprolol tartrate (LOPRESSOR) 50 MG tablet Take 50 mg by mouth 2 times daily   Yes Historical Provider, MD   montelukast (SINGULAIR) 10 MG tablet Take 10 mg by mouth nightly   Yes Historical Provider, MD   topiramate (TOPAMAX) 50 MG tablet Take 50 mg by mouth 2 times daily   Yes Historical Provider, MD   citalopram (CELEXA) 10 MG tablet Take 10 mg by mouth daily   Yes Historical Provider, MD   tiZANidine (ZANAFLEX) 4 MG tablet Take 4 mg by mouth every 8 hours as needed   Yes Historical Provider, MD   traZODone (DESYREL) 100 MG tablet Take 100 mg by mouth nightly   Yes Historical Provider, MD   Sennosides (SENEXON PO) Take 2 tablets by mouth nightly as needed   Yes Historical Provider, MD   vitamin D (ERGOCALCIFEROL) 1.25 MG (68756 UT) CAPS capsule Take 50,000 Units by mouth once a week   Yes Historical Provider, MD   Levalbuterol HCl (XOPENEX IN) Inhale 2 puffs into the lungs every 6 hours as needed   Yes Historical Provider, MD   omeprazole (PRILOSEC) 20 MG delayed release capsule Take 20 mg by mouth daily    Historical Provider, MD   rizatriptan (MAXALT) 10 MG tablet Take 10 mg by mouth once as needed for Migraine May repeat in 2 hours if needed    Historical Provider, MD   levalbuterol (XOPENEX) 1.25 MG/0.5ML nebulizer solution Take 1 ampule by nebulization every 6 hours as needed for Wheezing. Historical Provider, MD   budesonide-formoterol (SYMBICORT) 160-4.5 MCG/ACT AERO Inhale 2 puffs into the lungs 2 times daily. 6/15/14   Maria Del Rosario Campbell MD        Allergies:     Patient has no known allergies.     Social History:     Tobacco:    reports that she has been smoking cigarettes. She has been smoking about 0.50 packs per day. She has never used smokeless tobacco.  Alcohol:      reports no history of alcohol use. Drug Use:  reports no history of drug use. Family History:     No family history on file. Review of Systems:     Positive and Negative as described in HPI. CONSTITUTIONAL: Fatigue. Negative for fevers, chills, sweats, and weight loss. HEENT: Negative for glasses, hearing changes, rhinorrhea, and throat pain. RESPIRATORY: COPD - uses daily Symbicort. Denies frequent use of rescue inhaler. Sleep apnea - does not wear CPAP. Shortness of breath with exertion at rest. Negative for cough, congestion, and wheezing. CARDIOVASCULAR: Palpitations - managed with Metoprolol. HTN. Follows with Dr. Dee Schaefer (patient evaluated last week). Negative for chest pain, blood clot, irregular heartbeat. GASTROINTESTINAL: Acid reflux. Constipation. Negative for nausea, vomiting, diarrhea, change in bowel habits, and abdominal pain. GENITOURINARY: Negative for difficulty of urination, burning with urination, and frequency. INTEGUMENT: Easy bruising. Negative for rash, skin lesions. HEMATOLOGIC/LYMPHATIC: Bilateral feet swelling. ALLERGIC/IMMUNOLOGIC: Negative for urticaria and itching. ENDOCRINE: Negative for increase in thirst, increase in urination, and heat or cold intolerance. MUSCULOSKELETAL: See HPI   NEUROLOGICAL: Migraines. Numbness and tingling right lower extremity. Negative for dizziness, lightheadedness  BEHAVIOR/PSYCH: Anxiety and depression     Physical Exam:   /76   Pulse 84   Temp 98.2 °F (36.8 °C) (Oral)   Resp 16   Ht 5' 1\" (1.549 m)   Wt 211 lb 10.3 oz (96 kg)   LMP 06/01/2014   SpO2 99%   BMI 39.99 kg/m²   Patient's last menstrual period was 06/01/2014. No obstetric history on file. No results for input(s): POCGLU in the last 72 hours. General Appearance:  Alert, well appearing, and in no acute distress. Obese  Mental status: Oriented to person, place, and time. Head: Normocephalic and atraumatic. Eye: No icterus, redness, pupils equal and reactive, extraocular eye movements intact, and conjunctiva clear. Ear: Hearing grossly intact. Nose:  No drainage noted. Mouth:  Mucous membranes moist.  Neck:  Supple and no carotid bruits noted. Lungs:  Bilateral equal air entry, clear to auscultation, no wheezing, rales or rhonchi, and normal effort. Cardiovascular:  Normal rate, regular rhythm, no murmur, gallop, or rub. Abdomen:  Soft, nontender, nondistended, and active bowel sounds. Neurologic: Right lower extremity 3/5 strength. Left lower extremity strength 5/5. Bilateral upper extremity strength equal 5/5. Antalgic gait. Normal speech and cranial nerves II through XII grossly intact. Skin: No gross lesions, rashes, bruising, or bleeding on exposed skin area. Extremities: Posterior tibial pulses 2+ bilaterally. No pedal edema. No calf tenderness with palpation. Psych: Normal affect. Investigations:      Laboratory Testing:  Recent Results (from the past 24 hour(s))   Urinalysis    Collection Time: 02/07/22 12:36 PM   Result Value Ref Range    Color, UA Yellow Yellow    Turbidity UA Clear Clear    Glucose, Ur NEGATIVE NEGATIVE    Bilirubin Urine NEGATIVE NEGATIVE    Ketones, Urine NEGATIVE NEGATIVE    Specific Gravity, UA 1.025 1.005 - 1.030    Urine Hgb NEGATIVE NEGATIVE    pH, UA 6.5 5.0 - 8.0    Protein, UA NEGATIVE NEGATIVE    Urobilinogen, Urine Normal Normal    Nitrite, Urine NEGATIVE NEGATIVE    Leukocyte Esterase, Urine NEGATIVE NEGATIVE    Urinalysis Comments       Microscopic exam not performed based on chemical results unless requested in original order.    CBC Auto Differential    Collection Time: 02/07/22 12:37 PM   Result Value Ref Range    WBC 7.2 3.5 - 11.3 k/uL    RBC 4.51 3.95 - 5.11 m/uL    Hemoglobin 14.5 11.9 - 15.1 g/dL    Hematocrit 42.2 36.3 - 47.1 %    MCV 93.6 82.6 - 102.9 fL MCH 32.2 25.2 - 33.5 pg    MCHC 34.4 28.4 - 34.8 g/dL    RDW 12.5 11.8 - 14.4 %    Platelets 122 119 - 858 k/uL    MPV 9.6 8.1 - 13.5 fL    NRBC Automated 0.0 0.0 per 100 WBC    Differential Type NOT REPORTED     Seg Neutrophils 44 36 - 65 %    Lymphocytes 43 24 - 43 %    Monocytes 11 3 - 12 %    Eosinophils % 1 1 - 4 %    Basophils 1 0 - 2 %    Immature Granulocytes 0 0 %    Segs Absolute 3.20 1.50 - 8.10 k/uL    Absolute Lymph # 3.06 1.10 - 3.70 k/uL    Absolute Mono # 0.77 0.10 - 1.20 k/uL    Absolute Eos # 0.05 0.00 - 0.44 k/uL    Basophils Absolute 0.07 0.00 - 0.20 k/uL    Absolute Immature Granulocyte 0.02 0.00 - 0.30 k/uL    WBC Morphology NOT REPORTED     RBC Morphology NOT REPORTED     Platelet Estimate NOT REPORTED    APTT    Collection Time: 02/07/22 12:37 PM   Result Value Ref Range    PTT 30.2 23.9 - 33.8 sec   Protime-INR    Collection Time: 02/07/22 12:37 PM   Result Value Ref Range    Protime 13.5 11.5 - 14.2 sec    INR 1.0    Basic Metabolic Panel    Collection Time: 02/07/22 12:37 PM   Result Value Ref Range    Glucose 88 70 - 99 mg/dL    BUN 8 6 - 20 mg/dL    CREATININE 0.76 0.50 - 0.90 mg/dL    Bun/Cre Ratio 11 9 - 20    Calcium 9.1 8.6 - 10.4 mg/dL    Sodium 137 135 - 144 mmol/L    Potassium 3.7 3.7 - 5.3 mmol/L    Chloride 106 98 - 107 mmol/L    CO2 18 (L) 20 - 31 mmol/L    Anion Gap 13 9 - 17 mmol/L    GFR Non-African American >60 >60 mL/min    GFR African American >60 >60 mL/min    GFR Comment          GFR Staging NOT REPORTED        Recent Labs     02/07/22  1237   HGB 14.5   HCT 42.2   WBC 7.2   MCV 93.6      K 3.7      CO2 18*   BUN 8   CREATININE 0.76   GLUCOSE 88   INR 1.0   PROTIME 13.5   APTT 30.2       No results for input(s): COVID19 in the last 720 hours. *Please note that labs listed above are the most recent lab values available in [facility] at the time of the visit and additional labs may have been drawn or resulted since that time.     Imaging/Diagnostics:    No results found.    EK2022: See short chart. Diagnosis:      1. Lumbar spondylolisthesis    Plans:     1.  Right L3-4 TLIF      RODRIGUEZ Kerr - CNP  2022  1:15 PM

## 2022-02-07 NOTE — H&P
History and Physical Service   [facility]    HISTORY AND PHYSICAL EXAMINATION            Date of Evaluation: 2/7/2022  Patient name:  Rob Wayne  MRN:   [id number]  YOB: 1970  PCP:    Elif Vuong MD    History Obtained From:     Patient, medical records    History of Present Illness: This is Rob Wayne a 46 y.o. female who presents for a pre-admission testing appointment for an upcoming right L3-4 TLIF by Dr. Blanca Jimenez scheduled on 2/28/2022 at 0730 due to lumbar spondylolisthesis. The patient's chief complaint is 6-10/10 low back pain that has progressively worsened over the past many years. Low back pain is aggravated by standing and sitting long periods and is minimally relieved with rest. Patient has numbness and tingling down the right leg. Prior treatment includes physical therapy and injections. Patient fell 1 week ago in the snow - denies loss of consciousness or hitting her head. Patient states she injured her back. History of COPD, hypertension, and palpitations. Patient was evaluated by Dr. Sindhu Monson for pre-operative clearance who ordered stress test, EKG, echocardiogram, and holter monitor. Patient states she has an appointment to review test results on 2/16/2022 for surgical clearance. Denies chest pain. Patient has shortness of breath with exertion and occasional at rest and she has occasional palpitations. Lexiscan stress test 1/21/2022 (full report in short chart):  A review of reconstructed SPECT images shows a small fixed perfusion defect involving the anteroseptal wall segments. Given the patient's body habitus, breast attenuation artifact may contribute to this finding. A small reversible perfusion defect involving the anteroseptal wall segment. This involves less than 50% of the wall segment and is of low suspicion for ischemia. Myocardial perfusion appeared unremarkable elsewhere. Note is made of enlarged LV cavity on stress images. Overall quality of the study is poor. Gated SPECT wall motion analysis demonstrates normal myocardial thickening throughout the left ventricle. The left ventricular ejection fraction is calculated at 69%. 1. Myocardial perfusion imaging is abnormal.  2. No definite evidence of ischemia. 3. Normal gated SPECT. 4. EKG portion - negative for ischemia. 5. Stress myocardial imaging is low to medium risk for ischemia. Transthoracic echocardiography 1/21/2022 (full report in short chart):  1. Left ventricle: There is mild concentric left ventricular hypertrophy. No PARMJIT or LVOT obstruction. 2. Left ventricle: Global systolic function: Overall left ventricular systolic function is normal with an estimated ejection fraction of 55-60%. 3. Left ventricle: The diastolic function of the left ventricle is within normal limits. 4. Right ventricle: The right ventricular size is normal. Right ventricular systolic function is normal.   5. Mitral valve: There is trivial mitral regurgitation present. 6. Tricuspid valve: The peak velocity of tricuspid regurgitant is 2.22m/s with peak gradient of 19.64mmHg. There is no evidence of pulmonary hypertension with estimated right ventricular/pulmonary artery systolic pressure of 92.28 mmHg. 7. Pericardium: There is no pericardial effusion present. Functional Capacity per pt:   1) Pt is not able to walk 2 city blocks on level ground without SOB. 2) Pt is not able to climb 2 flights of stairs without SOB. 3) Pt is not able to walk up a hill for 1-2 city blocks without SOB.     Past Medical History:     Past Medical History:   Diagnosis Date    Anxiety     Arthritis     osteoarthritis    Asthma     Bulging disc     Colon polyp     COPD (chronic obstructive pulmonary disease) ([facility] [address]     Depression     Heartburn     Hypertension     Migraines     Palpitations     Sleep apnea     has a cpap mask but doesn't wear    Tachycardia     Vitamin D deficiency Past Surgical History:     Past Surgical History:   Procedure Laterality Date    COLONOSCOPY      HYSTERECTOMY      TUBAL LIGATION          Medications Prior to Admission:     Prior to Admission medications    Medication Sig Start Date End Date Taking? Authorizing Provider   valsartan (DIOVAN) 80 MG tablet Take 80 mg by mouth daily   Yes Historical Provider, MD   metoprolol tartrate (LOPRESSOR) 50 MG tablet Take 50 mg by mouth 2 times daily   Yes Historical Provider, MD   montelukast (SINGULAIR) 10 MG tablet Take 10 mg by mouth nightly   Yes Historical Provider, MD   topiramate (TOPAMAX) 50 MG tablet Take 50 mg by mouth 2 times daily   Yes Historical Provider, MD   citalopram (CELEXA) 10 MG tablet Take 10 mg by mouth daily   Yes Historical Provider, MD   tiZANidine (ZANAFLEX) 4 MG tablet Take 4 mg by mouth every 8 hours as needed   Yes Historical Provider, MD   traZODone (DESYREL) 100 MG tablet Take 100 mg by mouth nightly   Yes Historical Provider, MD   Sennosides (SENEXON PO) Take 2 tablets by mouth nightly as needed   Yes Historical Provider, MD   vitamin D (ERGOCALCIFEROL) 1.25 MG (96859 UT) CAPS capsule Take 50,000 Units by mouth once a week   Yes Historical Provider, MD   Levalbuterol HCl (XOPENEX IN) Inhale 2 puffs into the lungs every 6 hours as needed   Yes Historical Provider, MD   omeprazole (PRILOSEC) 20 MG delayed release capsule Take 20 mg by mouth daily    Historical Provider, MD   rizatriptan (MAXALT) 10 MG tablet Take 10 mg by mouth once as needed for Migraine May repeat in 2 hours if needed    Historical Provider, MD   levalbuterol (XOPENEX) 1.25 MG/0.5ML nebulizer solution Take 1 ampule by nebulization every 6 hours as needed for Wheezing. Historical Provider, MD   budesonide-formoterol (SYMBICORT) 160-4.5 MCG/ACT AERO Inhale 2 puffs into the lungs 2 times daily. 6/15/14   Nicholas Wheat MD        Allergies:     Patient has no known allergies.     Social History:     Tobacco:    reports that she has been smoking cigarettes. She has been smoking about 0.50 packs per day. She has never used smokeless tobacco.  Alcohol:      reports no history of alcohol use. Drug Use:  reports no history of drug use. Family History:     No family history on file. Review of Systems:     Positive and Negative as described in HPI. CONSTITUTIONAL: Fatigue. Negative for fevers, chills, sweats, and weight loss. HEENT: Negative for glasses, hearing changes, rhinorrhea, and throat pain. RESPIRATORY: COPD - uses daily Symbicort. Denies frequent use of rescue inhaler. Sleep apnea - does not wear CPAP. Shortness of breath with exertion at rest. Negative for cough, congestion, and wheezing. CARDIOVASCULAR: Palpitations - managed with Metoprolol. HTN. Follows with Dr. Claudene Saver (patient evaluated last week). Negative for chest pain, blood clot, irregular heartbeat. GASTROINTESTINAL: Acid reflux. Constipation. Negative for nausea, vomiting, diarrhea, change in bowel habits, and abdominal pain. GENITOURINARY: Negative for difficulty of urination, burning with urination, and frequency. INTEGUMENT: Easy bruising. Negative for rash, skin lesions. HEMATOLOGIC/LYMPHATIC: Bilateral feet swelling. ALLERGIC/IMMUNOLOGIC: Negative for urticaria and itching. ENDOCRINE: Negative for increase in thirst, increase in urination, and heat or cold intolerance. MUSCULOSKELETAL: See HPI   NEUROLOGICAL: Migraines. Numbness and tingling right lower extremity. Negative for dizziness, lightheadedness  BEHAVIOR/PSYCH: Anxiety and depression     Physical Exam:   /76   Pulse 84   Temp 98.2 °F (36.8 °C) (Oral)   Resp 16   Ht 5' 1\" (1.549 m)   Wt 211 lb 10.3 oz (96 kg)   LMP 06/01/2014   SpO2 99%   BMI 39.99 kg/m²   Patient's last menstrual period was 06/01/2014. No obstetric history on file. No results for input(s): POCGLU in the last 72 hours. General Appearance:  Alert, well appearing, and in no acute distress. Obese  Mental status: Oriented to person, place, and time. Head: Normocephalic and atraumatic. Eye: No icterus, redness, pupils equal and reactive, extraocular eye movements intact, and conjunctiva clear. Ear: Hearing grossly intact. Nose:  No drainage noted. Mouth:  Mucous membranes moist.  Neck:  Supple and no carotid bruits noted. Lungs:  Bilateral equal air entry, clear to auscultation, no wheezing, rales or rhonchi, and normal effort. Cardiovascular:  Normal rate, regular rhythm, no murmur, gallop, or rub. Abdomen:  Soft, nontender, nondistended, and active bowel sounds. Neurologic: Right lower extremity 3/5 strength. Left lower extremity strength 5/5. Bilateral upper extremity strength equal 5/5. Antalgic gait. Normal speech and cranial nerves II through XII grossly intact. Skin: No gross lesions, rashes, bruising, or bleeding on exposed skin area. Extremities: Posterior tibial pulses 2+ bilaterally. No pedal edema. No calf tenderness with palpation. Psych: Normal affect. Investigations:      Laboratory Testing:  Recent Results (from the past 24 hour(s))   Urinalysis    Collection Time: 02/07/22 12:36 PM   Result Value Ref Range    Color, UA Yellow Yellow    Turbidity UA Clear Clear    Glucose, Ur NEGATIVE NEGATIVE    Bilirubin Urine NEGATIVE NEGATIVE    Ketones, Urine NEGATIVE NEGATIVE    Specific Gravity, UA 1.025 1.005 - 1.030    Urine Hgb NEGATIVE NEGATIVE    pH, UA 6.5 5.0 - 8.0    Protein, UA NEGATIVE NEGATIVE    Urobilinogen, Urine Normal Normal    Nitrite, Urine NEGATIVE NEGATIVE    Leukocyte Esterase, Urine NEGATIVE NEGATIVE    Urinalysis Comments       Microscopic exam not performed based on chemical results unless requested in original order.    CBC Auto Differential    Collection Time: 02/07/22 12:37 PM   Result Value Ref Range    WBC 7.2 3.5 - 11.3 k/uL    RBC 4.51 3.95 - 5.11 m/uL    Hemoglobin 14.5 11.9 - 15.1 g/dL    Hematocrit 42.2 36.3 - 47.1 %    MCV 93.6 82.6 - 102.9 fL found.    EK2022: See short chart. Diagnosis:      1. Lumbar spondylolisthesis    Plans:     1.  Right L3-4 TLIF      RODRIGUEZ Kerr - CNP  2022  1:15 PM

## 2022-02-08 LAB
CULTURE: NO GROWTH
Lab: NORMAL
MRSA, DNA, NASAL: NEGATIVE
SPECIMEN DESCRIPTION: NORMAL
SPECIMEN DESCRIPTION: NORMAL

## 2022-02-25 ENCOUNTER — ANESTHESIA EVENT (OUTPATIENT)
Dept: OPERATING ROOM | Age: 52
End: 2022-02-25
Payer: MEDICARE

## 2022-02-28 ENCOUNTER — HOSPITAL ENCOUNTER (OUTPATIENT)
Age: 52
Discharge: HOME HEALTH | End: 2022-03-01
Attending: ORTHOPAEDIC SURGERY | Admitting: ORTHOPAEDIC SURGERY
Payer: MEDICARE

## 2022-02-28 ENCOUNTER — APPOINTMENT (OUTPATIENT)
Dept: GENERAL RADIOLOGY | Age: 52
End: 2022-02-28
Attending: ORTHOPAEDIC SURGERY
Payer: MEDICARE

## 2022-02-28 ENCOUNTER — ANESTHESIA (OUTPATIENT)
Dept: OPERATING ROOM | Age: 52
End: 2022-02-28
Payer: MEDICARE

## 2022-02-28 VITALS — TEMPERATURE: 72.3 F | DIASTOLIC BLOOD PRESSURE: 57 MMHG | OXYGEN SATURATION: 97 % | SYSTOLIC BLOOD PRESSURE: 111 MMHG

## 2022-02-28 DIAGNOSIS — G89.29 CHRONIC BILATERAL LOW BACK PAIN WITH RIGHT-SIDED SCIATICA: ICD-10-CM

## 2022-02-28 DIAGNOSIS — M54.41 CHRONIC BILATERAL LOW BACK PAIN WITH RIGHT-SIDED SCIATICA: ICD-10-CM

## 2022-02-28 DIAGNOSIS — M43.10 SPONDYLOLISTHESIS, ACQUIRED: Primary | ICD-10-CM

## 2022-02-28 PROBLEM — M48.061 FORAMINAL STENOSIS OF LUMBAR REGION: Chronic | Status: ACTIVE | Noted: 2022-02-28

## 2022-02-28 PROBLEM — G43.909 MIGRAINES: Status: ACTIVE | Noted: 2022-02-28

## 2022-02-28 PROBLEM — F32.A DEPRESSION: Status: ACTIVE | Noted: 2022-02-28

## 2022-02-28 PROBLEM — I10 ESSENTIAL HYPERTENSION: Status: ACTIVE | Noted: 2022-02-28

## 2022-02-28 PROCEDURE — 2580000003 HC RX 258: Performed by: ORTHOPAEDIC SURGERY

## 2022-02-28 PROCEDURE — 6370000000 HC RX 637 (ALT 250 FOR IP): Performed by: ORTHOPAEDIC SURGERY

## 2022-02-28 PROCEDURE — 7100000001 HC PACU RECOVERY - ADDTL 15 MIN: Performed by: ORTHOPAEDIC SURGERY

## 2022-02-28 PROCEDURE — 6360000002 HC RX W HCPCS: Performed by: ORTHOPAEDIC SURGERY

## 2022-02-28 PROCEDURE — 2720000010 HC SURG SUPPLY STERILE: Performed by: ORTHOPAEDIC SURGERY

## 2022-02-28 PROCEDURE — 2580000003 HC RX 258: Performed by: ANESTHESIOLOGY

## 2022-02-28 PROCEDURE — 2500000003 HC RX 250 WO HCPCS: Performed by: ORTHOPAEDIC SURGERY

## 2022-02-28 PROCEDURE — 6360000002 HC RX W HCPCS: Performed by: NURSE ANESTHETIST, CERTIFIED REGISTERED

## 2022-02-28 PROCEDURE — A4217 STERILE WATER/SALINE, 500 ML: HCPCS | Performed by: ORTHOPAEDIC SURGERY

## 2022-02-28 PROCEDURE — 2709999900 HC NON-CHARGEABLE SUPPLY: Performed by: ORTHOPAEDIC SURGERY

## 2022-02-28 PROCEDURE — 6360000002 HC RX W HCPCS: Performed by: ANESTHESIOLOGY

## 2022-02-28 PROCEDURE — 3600000014 HC SURGERY LEVEL 4 ADDTL 15MIN: Performed by: ORTHOPAEDIC SURGERY

## 2022-02-28 PROCEDURE — 3700000001 HC ADD 15 MINUTES (ANESTHESIA): Performed by: ORTHOPAEDIC SURGERY

## 2022-02-28 PROCEDURE — C1713 ANCHOR/SCREW BN/BN,TIS/BN: HCPCS | Performed by: ORTHOPAEDIC SURGERY

## 2022-02-28 PROCEDURE — 2500000003 HC RX 250 WO HCPCS: Performed by: NURSE ANESTHETIST, CERTIFIED REGISTERED

## 2022-02-28 PROCEDURE — 3209999900 FLUORO FOR SURGICAL PROCEDURES

## 2022-02-28 PROCEDURE — 3600000004 HC SURGERY LEVEL 4 BASE: Performed by: ORTHOPAEDIC SURGERY

## 2022-02-28 PROCEDURE — 99252 IP/OBS CONSLTJ NEW/EST SF 35: CPT | Performed by: NURSE PRACTITIONER

## 2022-02-28 PROCEDURE — 1200000000 HC SEMI PRIVATE

## 2022-02-28 PROCEDURE — C1889 IMPLANT/INSERT DEVICE, NOC: HCPCS | Performed by: ORTHOPAEDIC SURGERY

## 2022-02-28 PROCEDURE — 7100000000 HC PACU RECOVERY - FIRST 15 MIN: Performed by: ORTHOPAEDIC SURGERY

## 2022-02-28 PROCEDURE — 3700000000 HC ANESTHESIA ATTENDED CARE: Performed by: ORTHOPAEDIC SURGERY

## 2022-02-28 DEVICE — SCREW SPNL DIA5.5MM OPN TULIP LOK RELINE: Type: IMPLANTABLE DEVICE | Status: FUNCTIONAL

## 2022-02-28 DEVICE — GRAFT BONE SUB 30CC L1-10MM CANC CRUSH FRZ DRY: Type: IMPLANTABLE DEVICE | Status: FUNCTIONAL

## 2022-02-28 DEVICE — CAGE SPNL 4 DEG 30X10X12 MM TLIF-O MODULUS: Type: IMPLANTABLE DEVICE | Status: FUNCTIONAL

## 2022-02-28 DEVICE — DBM T44105 5CC GRAFTON CRUNCH
Type: IMPLANTABLE DEVICE | Status: FUNCTIONAL
Brand: GRAFTON®AND GRAFTON PLUS®DEMINERALIZED BONE MATRIX (DBM)

## 2022-02-28 DEVICE — ROD SPNL L45MM DIA5.5MM POST THORACOLUMBOSACRAL TI LORD: Type: IMPLANTABLE DEVICE | Site: BACK | Status: FUNCTIONAL

## 2022-02-28 DEVICE — SCREW SPNL L45MM DIA6.5MM POLYAX 2C RELINE MAS: Type: IMPLANTABLE DEVICE | Status: FUNCTIONAL

## 2022-02-28 DEVICE — SCREW SPNL MOD TULIP RELINE: Type: IMPLANTABLE DEVICE | Status: FUNCTIONAL

## 2022-02-28 DEVICE — SCREW SPNL L45MM DIA6.5MM POST THORACOLUMBOSACRAL MOD SHANK: Type: IMPLANTABLE DEVICE | Status: FUNCTIONAL

## 2022-02-28 RX ORDER — DEXAMETHASONE SODIUM PHOSPHATE 10 MG/ML
6 INJECTION, SOLUTION INTRAMUSCULAR; INTRAVENOUS EVERY 8 HOURS
Status: COMPLETED | OUTPATIENT
Start: 2022-02-28 | End: 2022-03-01

## 2022-02-28 RX ORDER — PHENYLEPHRINE HCL IN 0.9% NACL 1 MG/10 ML
SYRINGE (ML) INTRAVENOUS PRN
Status: DISCONTINUED | OUTPATIENT
Start: 2022-02-28 | End: 2022-02-28 | Stop reason: SDUPTHER

## 2022-02-28 RX ORDER — SODIUM CHLORIDE, SODIUM LACTATE, POTASSIUM CHLORIDE, CALCIUM CHLORIDE 600; 310; 30; 20 MG/100ML; MG/100ML; MG/100ML; MG/100ML
INJECTION, SOLUTION INTRAVENOUS CONTINUOUS
Status: DISCONTINUED | OUTPATIENT
Start: 2022-02-28 | End: 2022-02-28

## 2022-02-28 RX ORDER — OXYCODONE HYDROCHLORIDE AND ACETAMINOPHEN 5; 325 MG/1; MG/1
1 TABLET ORAL EVERY 4 HOURS PRN
Status: DISCONTINUED | OUTPATIENT
Start: 2022-02-28 | End: 2022-03-01 | Stop reason: HOSPADM

## 2022-02-28 RX ORDER — FENTANYL CITRATE 50 UG/ML
50 INJECTION, SOLUTION INTRAMUSCULAR; INTRAVENOUS EVERY 5 MIN PRN
Status: COMPLETED | OUTPATIENT
Start: 2022-02-28 | End: 2022-02-28

## 2022-02-28 RX ORDER — KETAMINE HCL IN NACL, ISO-OSM 100MG/10ML
SYRINGE (ML) INJECTION PRN
Status: DISCONTINUED | OUTPATIENT
Start: 2022-02-28 | End: 2022-02-28 | Stop reason: SDUPTHER

## 2022-02-28 RX ORDER — ONDANSETRON 2 MG/ML
4 INJECTION INTRAMUSCULAR; INTRAVENOUS
Status: DISCONTINUED | OUTPATIENT
Start: 2022-02-28 | End: 2022-02-28 | Stop reason: HOSPADM

## 2022-02-28 RX ORDER — ONDANSETRON 2 MG/ML
4 INJECTION INTRAMUSCULAR; INTRAVENOUS EVERY 6 HOURS PRN
Status: DISCONTINUED | OUTPATIENT
Start: 2022-02-28 | End: 2022-03-01 | Stop reason: HOSPADM

## 2022-02-28 RX ORDER — BUPIVACAINE HYDROCHLORIDE AND EPINEPHRINE 5; 5 MG/ML; UG/ML
INJECTION, SOLUTION EPIDURAL; INTRACAUDAL; PERINEURAL PRN
Status: DISCONTINUED | OUTPATIENT
Start: 2022-02-28 | End: 2022-02-28 | Stop reason: HOSPADM

## 2022-02-28 RX ORDER — MORPHINE SULFATE 2 MG/ML
2 INJECTION, SOLUTION INTRAMUSCULAR; INTRAVENOUS
Status: DISCONTINUED | OUTPATIENT
Start: 2022-02-28 | End: 2022-03-01 | Stop reason: HOSPADM

## 2022-02-28 RX ORDER — LIDOCAINE HYDROCHLORIDE 20 MG/ML
INJECTION, SOLUTION EPIDURAL; INFILTRATION; INTRACAUDAL; PERINEURAL PRN
Status: DISCONTINUED | OUTPATIENT
Start: 2022-02-28 | End: 2022-02-28 | Stop reason: SDUPTHER

## 2022-02-28 RX ORDER — DEXAMETHASONE SODIUM PHOSPHATE 10 MG/ML
INJECTION INTRAMUSCULAR; INTRAVENOUS PRN
Status: DISCONTINUED | OUTPATIENT
Start: 2022-02-28 | End: 2022-02-28 | Stop reason: SDUPTHER

## 2022-02-28 RX ORDER — POLYETHYLENE GLYCOL 3350 17 G/17G
17 POWDER, FOR SOLUTION ORAL DAILY
Status: DISCONTINUED | OUTPATIENT
Start: 2022-02-28 | End: 2022-03-01 | Stop reason: HOSPADM

## 2022-02-28 RX ORDER — FENTANYL CITRATE 50 UG/ML
INJECTION, SOLUTION INTRAMUSCULAR; INTRAVENOUS PRN
Status: DISCONTINUED | OUTPATIENT
Start: 2022-02-28 | End: 2022-02-28 | Stop reason: SDUPTHER

## 2022-02-28 RX ORDER — PROMETHAZINE HYDROCHLORIDE 12.5 MG/1
12.5 TABLET ORAL EVERY 6 HOURS PRN
Status: DISCONTINUED | OUTPATIENT
Start: 2022-02-28 | End: 2022-03-01 | Stop reason: HOSPADM

## 2022-02-28 RX ORDER — PANTOPRAZOLE SODIUM 40 MG/1
40 TABLET, DELAYED RELEASE ORAL
Status: DISCONTINUED | OUTPATIENT
Start: 2022-03-01 | End: 2022-03-01 | Stop reason: HOSPADM

## 2022-02-28 RX ORDER — MONTELUKAST SODIUM 10 MG/1
10 TABLET ORAL NIGHTLY
Status: DISCONTINUED | OUTPATIENT
Start: 2022-02-28 | End: 2022-03-01 | Stop reason: HOSPADM

## 2022-02-28 RX ORDER — LEVALBUTEROL 1.25 MG/.5ML
1 SOLUTION, CONCENTRATE RESPIRATORY (INHALATION) EVERY 6 HOURS PRN
Status: DISCONTINUED | OUTPATIENT
Start: 2022-02-28 | End: 2022-03-01 | Stop reason: HOSPADM

## 2022-02-28 RX ORDER — PROPOFOL 10 MG/ML
INJECTION, EMULSION INTRAVENOUS PRN
Status: DISCONTINUED | OUTPATIENT
Start: 2022-02-28 | End: 2022-02-28 | Stop reason: SDUPTHER

## 2022-02-28 RX ORDER — SODIUM CHLORIDE 0.9 % (FLUSH) 0.9 %
5-40 SYRINGE (ML) INJECTION PRN
Status: DISCONTINUED | OUTPATIENT
Start: 2022-02-28 | End: 2022-03-01 | Stop reason: HOSPADM

## 2022-02-28 RX ORDER — LIDOCAINE HYDROCHLORIDE 10 MG/ML
1 INJECTION, SOLUTION EPIDURAL; INFILTRATION; INTRACAUDAL; PERINEURAL
Status: DISCONTINUED | OUTPATIENT
Start: 2022-02-28 | End: 2022-02-28 | Stop reason: HOSPADM

## 2022-02-28 RX ORDER — HYDROXYZINE HYDROCHLORIDE 10 MG/1
10 TABLET, FILM COATED ORAL EVERY 8 HOURS PRN
Status: DISCONTINUED | OUTPATIENT
Start: 2022-02-28 | End: 2022-03-01 | Stop reason: HOSPADM

## 2022-02-28 RX ORDER — KETOROLAC TROMETHAMINE 30 MG/ML
INJECTION, SOLUTION INTRAMUSCULAR; INTRAVENOUS PRN
Status: DISCONTINUED | OUTPATIENT
Start: 2022-02-28 | End: 2022-02-28 | Stop reason: SDUPTHER

## 2022-02-28 RX ORDER — ONDANSETRON 2 MG/ML
INJECTION INTRAMUSCULAR; INTRAVENOUS PRN
Status: DISCONTINUED | OUTPATIENT
Start: 2022-02-28 | End: 2022-02-28 | Stop reason: SDUPTHER

## 2022-02-28 RX ORDER — TIZANIDINE 4 MG/1
4 TABLET ORAL EVERY 8 HOURS PRN
Status: DISCONTINUED | OUTPATIENT
Start: 2022-02-28 | End: 2022-03-01 | Stop reason: HOSPADM

## 2022-02-28 RX ORDER — FENTANYL CITRATE 50 UG/ML
25 INJECTION, SOLUTION INTRAMUSCULAR; INTRAVENOUS EVERY 5 MIN PRN
Status: DISCONTINUED | OUTPATIENT
Start: 2022-02-28 | End: 2022-02-28 | Stop reason: HOSPADM

## 2022-02-28 RX ORDER — VANCOMYCIN HYDROCHLORIDE 1 G/20ML
INJECTION, POWDER, LYOPHILIZED, FOR SOLUTION INTRAVENOUS PRN
Status: DISCONTINUED | OUTPATIENT
Start: 2022-02-28 | End: 2022-02-28 | Stop reason: HOSPADM

## 2022-02-28 RX ORDER — SODIUM CHLORIDE 9 MG/ML
INJECTION, SOLUTION INTRAVENOUS CONTINUOUS
Status: DISCONTINUED | OUTPATIENT
Start: 2022-03-01 | End: 2022-02-28

## 2022-02-28 RX ORDER — SENNA AND DOCUSATE SODIUM 50; 8.6 MG/1; MG/1
1 TABLET, FILM COATED ORAL 2 TIMES DAILY
Status: DISCONTINUED | OUTPATIENT
Start: 2022-02-28 | End: 2022-03-01 | Stop reason: HOSPADM

## 2022-02-28 RX ORDER — SODIUM CHLORIDE 9 MG/ML
25 INJECTION, SOLUTION INTRAVENOUS PRN
Status: DISCONTINUED | OUTPATIENT
Start: 2022-02-28 | End: 2022-03-01 | Stop reason: HOSPADM

## 2022-02-28 RX ORDER — TRAZODONE HYDROCHLORIDE 100 MG/1
100 TABLET ORAL NIGHTLY
Status: DISCONTINUED | OUTPATIENT
Start: 2022-02-28 | End: 2022-03-01 | Stop reason: HOSPADM

## 2022-02-28 RX ORDER — MORPHINE SULFATE 15 MG/1
15 TABLET, FILM COATED, EXTENDED RELEASE ORAL EVERY 12 HOURS SCHEDULED
Status: DISCONTINUED | OUTPATIENT
Start: 2022-02-28 | End: 2022-03-01 | Stop reason: HOSPADM

## 2022-02-28 RX ORDER — SODIUM CHLORIDE 9 MG/ML
INJECTION, SOLUTION INTRAVENOUS CONTINUOUS
Status: DISCONTINUED | OUTPATIENT
Start: 2022-02-28 | End: 2022-03-01 | Stop reason: HOSPADM

## 2022-02-28 RX ORDER — VALSARTAN 80 MG/1
80 TABLET ORAL DAILY
Status: DISCONTINUED | OUTPATIENT
Start: 2022-02-28 | End: 2022-03-01

## 2022-02-28 RX ORDER — SUCCINYLCHOLINE/SOD CL,ISO/PF 100 MG/5ML
SYRINGE (ML) INTRAVENOUS PRN
Status: DISCONTINUED | OUTPATIENT
Start: 2022-02-28 | End: 2022-02-28 | Stop reason: SDUPTHER

## 2022-02-28 RX ORDER — SODIUM CHLORIDE 0.9 % (FLUSH) 0.9 %
10 SYRINGE (ML) INJECTION PRN
Status: DISCONTINUED | OUTPATIENT
Start: 2022-02-28 | End: 2022-02-28 | Stop reason: HOSPADM

## 2022-02-28 RX ORDER — SODIUM CHLORIDE 9 MG/ML
25 INJECTION, SOLUTION INTRAVENOUS PRN
Status: DISCONTINUED | OUTPATIENT
Start: 2022-02-28 | End: 2022-02-28 | Stop reason: HOSPADM

## 2022-02-28 RX ORDER — FAMOTIDINE 20 MG/1
20 TABLET, FILM COATED ORAL 2 TIMES DAILY
Status: DISCONTINUED | OUTPATIENT
Start: 2022-02-28 | End: 2022-03-01 | Stop reason: HOSPADM

## 2022-02-28 RX ORDER — OXYCODONE HYDROCHLORIDE AND ACETAMINOPHEN 5; 325 MG/1; MG/1
2 TABLET ORAL EVERY 4 HOURS PRN
Status: DISCONTINUED | OUTPATIENT
Start: 2022-02-28 | End: 2022-03-01 | Stop reason: HOSPADM

## 2022-02-28 RX ORDER — CITALOPRAM 10 MG/1
10 TABLET ORAL DAILY
Status: DISCONTINUED | OUTPATIENT
Start: 2022-02-28 | End: 2022-03-01 | Stop reason: HOSPADM

## 2022-02-28 RX ORDER — TOPIRAMATE 25 MG/1
50 TABLET ORAL 2 TIMES DAILY
Status: DISCONTINUED | OUTPATIENT
Start: 2022-02-28 | End: 2022-03-01 | Stop reason: HOSPADM

## 2022-02-28 RX ORDER — MIDAZOLAM HYDROCHLORIDE 1 MG/ML
INJECTION INTRAMUSCULAR; INTRAVENOUS PRN
Status: DISCONTINUED | OUTPATIENT
Start: 2022-02-28 | End: 2022-02-28 | Stop reason: SDUPTHER

## 2022-02-28 RX ORDER — METOPROLOL TARTRATE 50 MG/1
50 TABLET, FILM COATED ORAL 2 TIMES DAILY
Status: DISCONTINUED | OUTPATIENT
Start: 2022-02-28 | End: 2022-03-01

## 2022-02-28 RX ORDER — HYDROMORPHONE HCL 110MG/55ML
PATIENT CONTROLLED ANALGESIA SYRINGE INTRAVENOUS PRN
Status: DISCONTINUED | OUTPATIENT
Start: 2022-02-28 | End: 2022-02-28 | Stop reason: SDUPTHER

## 2022-02-28 RX ORDER — BUDESONIDE AND FORMOTEROL FUMARATE DIHYDRATE 160; 4.5 UG/1; UG/1
2 AEROSOL RESPIRATORY (INHALATION) 2 TIMES DAILY
Status: DISCONTINUED | OUTPATIENT
Start: 2022-02-28 | End: 2022-03-01 | Stop reason: HOSPADM

## 2022-02-28 RX ORDER — SODIUM CHLORIDE 0.9 % (FLUSH) 0.9 %
5-40 SYRINGE (ML) INJECTION EVERY 12 HOURS SCHEDULED
Status: DISCONTINUED | OUTPATIENT
Start: 2022-02-28 | End: 2022-03-01 | Stop reason: HOSPADM

## 2022-02-28 RX ORDER — MORPHINE SULFATE 4 MG/ML
4 INJECTION, SOLUTION INTRAMUSCULAR; INTRAVENOUS
Status: DISCONTINUED | OUTPATIENT
Start: 2022-02-28 | End: 2022-03-01 | Stop reason: HOSPADM

## 2022-02-28 RX ORDER — SODIUM CHLORIDE 0.9 % (FLUSH) 0.9 %
10 SYRINGE (ML) INJECTION EVERY 12 HOURS SCHEDULED
Status: DISCONTINUED | OUTPATIENT
Start: 2022-02-28 | End: 2022-02-28 | Stop reason: HOSPADM

## 2022-02-28 RX ADMIN — POLYETHYLENE GLYCOL 3350 17 G: 17 POWDER, FOR SOLUTION ORAL at 18:41

## 2022-02-28 RX ADMIN — MIDAZOLAM 2 MG: 1 INJECTION INTRAMUSCULAR; INTRAVENOUS at 11:24

## 2022-02-28 RX ADMIN — Medication 100 MCG: at 12:20

## 2022-02-28 RX ADMIN — Medication 100 MCG: at 11:30

## 2022-02-28 RX ADMIN — HYDROMORPHONE HYDROCHLORIDE 1 MG: 2 INJECTION INTRAMUSCULAR; INTRAVENOUS; SUBCUTANEOUS at 13:40

## 2022-02-28 RX ADMIN — CEFAZOLIN SODIUM 2000 MG: 10 INJECTION, POWDER, FOR SOLUTION INTRAVENOUS at 11:24

## 2022-02-28 RX ADMIN — FAMOTIDINE 20 MG: 20 TABLET, FILM COATED ORAL at 20:08

## 2022-02-28 RX ADMIN — SENNOSIDES AND DOCUSATE SODIUM 1 TABLET: 50; 8.6 TABLET ORAL at 20:08

## 2022-02-28 RX ADMIN — Medication 25 MG: at 11:35

## 2022-02-28 RX ADMIN — TOPIRAMATE 50 MG: 25 TABLET, FILM COATED ORAL at 20:08

## 2022-02-28 RX ADMIN — OXYCODONE AND ACETAMINOPHEN 2 TABLET: 5; 325 TABLET ORAL at 23:43

## 2022-02-28 RX ADMIN — OXYCODONE AND ACETAMINOPHEN 2 TABLET: 5; 325 TABLET ORAL at 17:48

## 2022-02-28 RX ADMIN — Medication 200 MCG: at 11:50

## 2022-02-28 RX ADMIN — LIDOCAINE HYDROCHLORIDE 80 MG: 20 INJECTION, SOLUTION EPIDURAL; INFILTRATION; INTRACAUDAL; PERINEURAL at 11:30

## 2022-02-28 RX ADMIN — MORPHINE SULFATE 4 MG: 4 INJECTION, SOLUTION INTRAMUSCULAR; INTRAVENOUS at 18:49

## 2022-02-28 RX ADMIN — Medication 100 MCG: at 12:28

## 2022-02-28 RX ADMIN — SODIUM CHLORIDE, POTASSIUM CHLORIDE, SODIUM LACTATE AND CALCIUM CHLORIDE: 600; 310; 30; 20 INJECTION, SOLUTION INTRAVENOUS at 10:46

## 2022-02-28 RX ADMIN — DEXAMETHASONE SODIUM PHOSPHATE 6 MG: 10 INJECTION INTRAMUSCULAR; INTRAVENOUS at 20:08

## 2022-02-28 RX ADMIN — MORPHINE SULFATE 15 MG: 15 TABLET, FILM COATED, EXTENDED RELEASE ORAL at 17:48

## 2022-02-28 RX ADMIN — Medication 25 MG: at 13:53

## 2022-02-28 RX ADMIN — ONDANSETRON 4 MG: 2 INJECTION INTRAMUSCULAR; INTRAVENOUS at 11:27

## 2022-02-28 RX ADMIN — PROPOFOL 175 MG: 10 INJECTION, EMULSION INTRAVENOUS at 11:30

## 2022-02-28 RX ADMIN — Medication 100 MG: at 11:30

## 2022-02-28 RX ADMIN — FENTANYL CITRATE 50 MCG: 0.05 INJECTION, SOLUTION INTRAMUSCULAR; INTRAVENOUS at 15:54

## 2022-02-28 RX ADMIN — Medication 200 MCG: at 13:22

## 2022-02-28 RX ADMIN — CEFAZOLIN SODIUM 2000 MG: 10 INJECTION, POWDER, FOR SOLUTION INTRAVENOUS at 20:13

## 2022-02-28 RX ADMIN — Medication 200 MCG: at 13:58

## 2022-02-28 RX ADMIN — Medication 200 MCG: at 11:54

## 2022-02-28 RX ADMIN — FENTANYL CITRATE 50 MCG: 0.05 INJECTION, SOLUTION INTRAMUSCULAR; INTRAVENOUS at 15:29

## 2022-02-28 RX ADMIN — VALSARTAN 80 MG: 80 TABLET, FILM COATED ORAL at 18:41

## 2022-02-28 RX ADMIN — KETOROLAC TROMETHAMINE 30 MG: 30 INJECTION, SOLUTION INTRAMUSCULAR; INTRAVENOUS at 14:21

## 2022-02-28 RX ADMIN — FENTANYL CITRATE 50 MCG: 0.05 INJECTION, SOLUTION INTRAMUSCULAR; INTRAVENOUS at 16:20

## 2022-02-28 RX ADMIN — CITALOPRAM HYDROBROMIDE 10 MG: 10 TABLET ORAL at 18:41

## 2022-02-28 RX ADMIN — TRAZODONE HYDROCHLORIDE 100 MG: 100 TABLET ORAL at 20:08

## 2022-02-28 RX ADMIN — DEXAMETHASONE SODIUM PHOSPHATE 10 MG: 10 INJECTION INTRAMUSCULAR; INTRAVENOUS at 11:35

## 2022-02-28 RX ADMIN — METOPROLOL TARTRATE 50 MG: 50 TABLET, FILM COATED ORAL at 20:08

## 2022-02-28 RX ADMIN — Medication 200 MCG: at 13:28

## 2022-02-28 RX ADMIN — PROPOFOL 25 MG: 10 INJECTION, EMULSION INTRAVENOUS at 11:40

## 2022-02-28 RX ADMIN — TIZANIDINE 4 MG: 4 TABLET ORAL at 20:08

## 2022-02-28 RX ADMIN — Medication 200 MCG: at 13:02

## 2022-02-28 RX ADMIN — MONTELUKAST 10 MG: 10 TABLET, FILM COATED ORAL at 20:07

## 2022-02-28 RX ADMIN — SODIUM CHLORIDE, POTASSIUM CHLORIDE, SODIUM LACTATE AND CALCIUM CHLORIDE: 600; 310; 30; 20 INJECTION, SOLUTION INTRAVENOUS at 13:01

## 2022-02-28 RX ADMIN — Medication 200 MCG: at 12:57

## 2022-02-28 RX ADMIN — FENTANYL CITRATE 50 MCG: 0.05 INJECTION, SOLUTION INTRAMUSCULAR; INTRAVENOUS at 16:36

## 2022-02-28 RX ADMIN — SODIUM CHLORIDE: 9 INJECTION, SOLUTION INTRAVENOUS at 18:46

## 2022-02-28 RX ADMIN — PROPOFOL 75 MCG/KG/MIN: 10 INJECTION, EMULSION INTRAVENOUS at 11:45

## 2022-02-28 ASSESSMENT — PULMONARY FUNCTION TESTS
PIF_VALUE: 25
PIF_VALUE: 26
PIF_VALUE: 30
PIF_VALUE: 25
PIF_VALUE: 27
PIF_VALUE: 25
PIF_VALUE: 18
PIF_VALUE: 26
PIF_VALUE: 26
PIF_VALUE: 29
PIF_VALUE: 26
PIF_VALUE: 31
PIF_VALUE: 28
PIF_VALUE: 29
PIF_VALUE: 27
PIF_VALUE: 25
PIF_VALUE: 29
PIF_VALUE: 29
PIF_VALUE: 27
PIF_VALUE: 16
PIF_VALUE: 25
PIF_VALUE: 26
PIF_VALUE: 27
PIF_VALUE: 1
PIF_VALUE: 25
PIF_VALUE: 0
PIF_VALUE: 24
PIF_VALUE: 26
PIF_VALUE: 27
PIF_VALUE: 31
PIF_VALUE: 27
PIF_VALUE: 27
PIF_VALUE: 30
PIF_VALUE: 25
PIF_VALUE: 30
PIF_VALUE: 29
PIF_VALUE: 25
PIF_VALUE: 27
PIF_VALUE: 27
PIF_VALUE: 30
PIF_VALUE: 27
PIF_VALUE: 29
PIF_VALUE: 2
PIF_VALUE: 27
PIF_VALUE: 25
PIF_VALUE: 36
PIF_VALUE: 27
PIF_VALUE: 29
PIF_VALUE: 27
PIF_VALUE: 25
PIF_VALUE: 26
PIF_VALUE: 16
PIF_VALUE: 28
PIF_VALUE: 30
PIF_VALUE: 24
PIF_VALUE: 26
PIF_VALUE: 12
PIF_VALUE: 28
PIF_VALUE: 16
PIF_VALUE: 1
PIF_VALUE: 25
PIF_VALUE: 2
PIF_VALUE: 28
PIF_VALUE: 25
PIF_VALUE: 26
PIF_VALUE: 25
PIF_VALUE: 30
PIF_VALUE: 11
PIF_VALUE: 27
PIF_VALUE: 27
PIF_VALUE: 30
PIF_VALUE: 29
PIF_VALUE: 26
PIF_VALUE: 8
PIF_VALUE: 25
PIF_VALUE: 28
PIF_VALUE: 25
PIF_VALUE: 29
PIF_VALUE: 26
PIF_VALUE: 26
PIF_VALUE: 27
PIF_VALUE: 26
PIF_VALUE: 25
PIF_VALUE: 27
PIF_VALUE: 16
PIF_VALUE: 31
PIF_VALUE: 5
PIF_VALUE: 28
PIF_VALUE: 27
PIF_VALUE: 29
PIF_VALUE: 8
PIF_VALUE: 29
PIF_VALUE: 27
PIF_VALUE: 25
PIF_VALUE: 25
PIF_VALUE: 26
PIF_VALUE: 29
PIF_VALUE: 28
PIF_VALUE: 26
PIF_VALUE: 9
PIF_VALUE: 3
PIF_VALUE: 16
PIF_VALUE: 1
PIF_VALUE: 29
PIF_VALUE: 8
PIF_VALUE: 9
PIF_VALUE: 27
PIF_VALUE: 23
PIF_VALUE: 28
PIF_VALUE: 28
PIF_VALUE: 27
PIF_VALUE: 27
PIF_VALUE: 28
PIF_VALUE: 27
PIF_VALUE: 3
PIF_VALUE: 8
PIF_VALUE: 27
PIF_VALUE: 8
PIF_VALUE: 26
PIF_VALUE: 9
PIF_VALUE: 26
PIF_VALUE: 28
PIF_VALUE: 14
PIF_VALUE: 25
PIF_VALUE: 26
PIF_VALUE: 38
PIF_VALUE: 29
PIF_VALUE: 27
PIF_VALUE: 29
PIF_VALUE: 30
PIF_VALUE: 30
PIF_VALUE: 26
PIF_VALUE: 27
PIF_VALUE: 31
PIF_VALUE: 28
PIF_VALUE: 30
PIF_VALUE: 26
PIF_VALUE: 28
PIF_VALUE: 27
PIF_VALUE: 30
PIF_VALUE: 30
PIF_VALUE: 26
PIF_VALUE: 36
PIF_VALUE: 29
PIF_VALUE: 1
PIF_VALUE: 25
PIF_VALUE: 26
PIF_VALUE: 30
PIF_VALUE: 26
PIF_VALUE: 39
PIF_VALUE: 30
PIF_VALUE: 28
PIF_VALUE: 26
PIF_VALUE: 27
PIF_VALUE: 30
PIF_VALUE: 1
PIF_VALUE: 26
PIF_VALUE: 28
PIF_VALUE: 29
PIF_VALUE: 27
PIF_VALUE: 25
PIF_VALUE: 24
PIF_VALUE: 25
PIF_VALUE: 6
PIF_VALUE: 25
PIF_VALUE: 28
PIF_VALUE: 24
PIF_VALUE: 26
PIF_VALUE: 28
PIF_VALUE: 30
PIF_VALUE: 27
PIF_VALUE: 28
PIF_VALUE: 29
PIF_VALUE: 27
PIF_VALUE: 28
PIF_VALUE: 27
PIF_VALUE: 25
PIF_VALUE: 40
PIF_VALUE: 26
PIF_VALUE: 25
PIF_VALUE: 27
PIF_VALUE: 26
PIF_VALUE: 27
PIF_VALUE: 26
PIF_VALUE: 7
PIF_VALUE: 27
PIF_VALUE: 27
PIF_VALUE: 30
PIF_VALUE: 25
PIF_VALUE: 27
PIF_VALUE: 27
PIF_VALUE: 26
PIF_VALUE: 25
PIF_VALUE: 29
PIF_VALUE: 30
PIF_VALUE: 28
PIF_VALUE: 29
PIF_VALUE: 1
PIF_VALUE: 29
PIF_VALUE: 27
PIF_VALUE: 28
PIF_VALUE: 27
PIF_VALUE: 6
PIF_VALUE: 25
PIF_VALUE: 30
PIF_VALUE: 1
PIF_VALUE: 27

## 2022-02-28 ASSESSMENT — ENCOUNTER SYMPTOMS
COLOR CHANGE: 0
VOMITING: 0
SHORTNESS OF BREATH: 1
ROS SKIN COMMENTS: SURGICAL INCISION
COUGH: 0
SORE THROAT: 0
NAUSEA: 0
BACK PAIN: 1
SHORTNESS OF BREATH: 0

## 2022-02-28 ASSESSMENT — PAIN DESCRIPTION - DESCRIPTORS
DESCRIPTORS: ACHING
DESCRIPTORS: ACHING

## 2022-02-28 ASSESSMENT — PAIN DESCRIPTION - DIRECTION: RADIATING_TOWARDS: RIGHT LEG

## 2022-02-28 ASSESSMENT — PAIN SCALES - GENERAL
PAINLEVEL_OUTOF10: 8
PAINLEVEL_OUTOF10: 8
PAINLEVEL_OUTOF10: 10
PAINLEVEL_OUTOF10: 7
PAINLEVEL_OUTOF10: 6
PAINLEVEL_OUTOF10: 9
PAINLEVEL_OUTOF10: 8
PAINLEVEL_OUTOF10: 9
PAINLEVEL_OUTOF10: 9
PAINLEVEL_OUTOF10: 10

## 2022-02-28 ASSESSMENT — PAIN DESCRIPTION - PAIN TYPE
TYPE: CHRONIC PAIN
TYPE: SURGICAL PAIN
TYPE: SURGICAL PAIN

## 2022-02-28 ASSESSMENT — PAIN DESCRIPTION - LOCATION
LOCATION: BACK;LEG
LOCATION: BACK
LOCATION: BACK

## 2022-02-28 ASSESSMENT — PAIN DESCRIPTION - ORIENTATION
ORIENTATION: RIGHT;LEFT
ORIENTATION: MID
ORIENTATION: MID

## 2022-02-28 ASSESSMENT — PAIN DESCRIPTION - ONSET: ONSET: SUDDEN

## 2022-02-28 NOTE — ANESTHESIA PRE PROCEDURE
Department of Anesthesiology  Preprocedure Note       Name:  Alexia Molina   Age:  46 y.o.  :  1970                                          MRN:  [id number]         Date:  2022      Surgeon: Jarred Kang):  Vinod Edward MD    Procedure: Procedure(s):  RIGHT L3-4 TLIF -2 CARMS, NUVASIVE    Medications prior to admission:   Prior to Admission medications    Medication Sig Start Date End Date Taking? Authorizing Provider   valsartan (DIOVAN) 80 MG tablet Take 80 mg by mouth daily   Yes Historical Provider, MD   metoprolol tartrate (LOPRESSOR) 50 MG tablet Take 50 mg by mouth 2 times daily   Yes Historical Provider, MD   montelukast (SINGULAIR) 10 MG tablet Take 10 mg by mouth nightly   Yes Historical Provider, MD   topiramate (TOPAMAX) 50 MG tablet Take 50 mg by mouth 2 times daily   Yes Historical Provider, MD   citalopram (CELEXA) 10 MG tablet Take 10 mg by mouth daily   Yes Historical Provider, MD   tiZANidine (ZANAFLEX) 4 MG tablet Take 4 mg by mouth every 8 hours as needed   Yes Historical Provider, MD   traZODone (DESYREL) 100 MG tablet Take 100 mg by mouth nightly   Yes Historical Provider, MD   Sennosides (SENEXON PO) Take 2 tablets by mouth nightly as needed   Yes Historical Provider, MD   vitamin D (ERGOCALCIFEROL) 1.25 MG (55381 UT) CAPS capsule Take 50,000 Units by mouth once a week   Yes Historical Provider, MD   omeprazole (PRILOSEC) 20 MG delayed release capsule Take 20 mg by mouth daily   Yes Historical Provider, MD   rizatriptan (MAXALT) 10 MG tablet Take 10 mg by mouth once as needed for Migraine May repeat in 2 hours if needed   Yes Historical Provider, MD   budesonide-formoterol (SYMBICORT) 160-4.5 MCG/ACT AERO Inhale 2 puffs into the lungs 2 times daily.  6/15/14  Yes Josephine Cisse MD   Levalbuterol HCl (XOPENEX IN) Inhale 2 puffs into the lungs every 6 hours as needed    Historical Provider, MD   levalbuterol (XOPENEX) 1.25 MG/0.5ML nebulizer solution Take 1 ampule by nebulization every 6 hours as needed for Wheezing. Historical Provider, MD       Current medications:    Current Facility-Administered Medications   Medication Dose Route Frequency Provider Last Rate Last Admin    lidocaine PF 1 % injection 1 mL  1 mL IntraDERmal Once PRN Sean Avendaño, DO        lactated ringers infusion   IntraVENous Continuous Seandorothy Avendaño, DO        sodium chloride flush 0.9 % injection 10 mL  10 mL IntraVENous 2 times per day Seandorothy Avendaño, DO        sodium chloride flush 0.9 % injection 10 mL  10 mL IntraVENous PRN Brandon Less, DO        0.9 % sodium chloride infusion  25 mL IntraVENous PRN Seandorothy Avendaño, DO        ceFAZolin (ANCEF) 2000 mg in dextrose 5 % 50 mL IVPB  2,000 mg IntraVENous Once Bryan Streeter MD           Allergies:  No Known Allergies    Problem List:    Patient Active Problem List   Diagnosis Code    COPD exacerbation ([facility] J44.1    Asthma J45.909    Tachycardia R00.0    SOB (shortness of breath) R06.02    Acute bronchitis J20.9    Hypokalemia E87.6    Pulmonary atelectasis J98.11    Acute bronchitis J20.9    Hyperglycemia R73.9       Past Medical History:        Diagnosis Date    Anxiety     Arthritis     osteoarthritis    Asthma     Bulging disc     Colon polyp     COPD (chronic obstructive pulmonary disease) ([facility]     Depression     Heartburn     Hypertension     Migraines     Palpitations     Sleep apnea     has a cpap mask but doesn't wear    Tachycardia     Vitamin D deficiency        Past Surgical History:        Procedure Laterality Date    COLONOSCOPY      HYSTERECTOMY      TUBAL LIGATION         Social History:    Social History     Tobacco Use    Smoking status: Former Smoker     Packs/day: 0.00    Smokeless tobacco: Never Used   Substance Use Topics    Alcohol use:  No                                Counseling given: Not Answered      Vital Signs (Current):   Vitals:    02/28/22 1015   BP: (!) 145/82   Pulse: 68 Resp: 18   Temp: 96.8 °F (36 °C)   TempSrc: Temporal   SpO2: 100%                                              BP Readings from Last 3 Encounters:   02/28/22 (!) 145/82   02/07/22 138/76   02/20/18 (!) 141/86       NPO Status: Time of last liquid consumption: 2300                        Time of last solid consumption: 2300                        Date of last liquid consumption: 02/27/22                        Date of last solid food consumption: 02/27/22    BMI:   Wt Readings from Last 3 Encounters:   02/07/22 211 lb 10.3 oz (96 kg)   02/16/18 190 lb (86.2 kg)     There is no height or weight on file to calculate BMI.    CBC:   Lab Results   Component Value Date    WBC 7.2 02/07/2022    RBC 4.51 02/07/2022    HGB 14.5 02/07/2022    HCT 42.2 02/07/2022    MCV 93.6 02/07/2022    RDW 12.5 02/07/2022     02/07/2022       CMP:   Lab Results   Component Value Date     02/07/2022    K 3.7 02/07/2022     02/07/2022    CO2 18 02/07/2022    BUN 8 02/07/2022    CREATININE 0.76 02/07/2022    GFRAA >60 02/07/2022    LABGLOM >60 02/07/2022    GLUCOSE 88 02/07/2022    GLUCOSE 82 03/27/2012    PROT 7.1 08/21/2015    CALCIUM 9.1 02/07/2022    BILITOT 0.37 08/21/2015    ALKPHOS 61 08/21/2015    AST 18 08/21/2015    ALT 13 08/21/2015       POC Tests: No results for input(s): POCGLU, POCNA, POCK, POCCL, POCBUN, POCHEMO, POCHCT in the last 72 hours.     Coags:   Lab Results   Component Value Date    PROTIME 13.5 02/07/2022    INR 1.0 02/07/2022    APTT 30.2 02/07/2022       HCG (If Applicable):   Lab Results   Component Value Date    HCG NEGATIVE 07/16/2015        ABGs:   Lab Results   Component Value Date    PHART 7.42 07/08/2013    PO2ART 70 07/08/2013    DTY1ZYZ 33 07/08/2013    HBG7IUC 21.0 07/08/2013    J1DLBYQQ 96.8 07/08/2013        Type & Screen (If Applicable):  No results found for: LABABO, LABRH    Drug/Infectious Status (If Applicable):  No results found for: HIV, HEPCAB    COVID-19 Screening (If Applicable): No results found for: COVID19        Anesthesia Evaluation    Airway: Mallampati: I  TM distance: >3 FB   Neck ROM: full  Mouth opening: > = 3 FB Dental:          Pulmonary:   (+) COPD:  shortness of breath: chronic and no interval change,  sleep apnea:                             Cardiovascular:    (+) hypertension:,       ECG reviewed      Echocardiogram reviewed                  Neuro/Psych:               GI/Hepatic/Renal:             Endo/Other:                     Abdominal:             Vascular:           Other Findings:             Anesthesia Plan      general     ASA 3                                 Theta MD Pa   2/28/2022

## 2022-02-28 NOTE — INTERVAL H&P NOTE
Interval H&P Note    Pt Name: Mariluz Wild  MRN: [id number]  YOB: 1970  Date of evaluation: 2/28/2022      [x] I have reviewed H&P BY JESICA SHERMAN WHICH MEETS CRITERIA FOR AN  Interval History and Physical note. ABOVE     [x] I have examined  Mariluz Wild  There are no changes to the patient who is scheduled for a RIGHT L3 /L 4 TRANS LUMBAR INTERBODY FUSION BY . .  The patient denies new health changes, fever, chills, wheezing, cough, increased SOB, chest pain, open sores or wounds. SHE DOES NOT TAKE BLOOD THINNERS, SHE DOES NOT HAVE DIABETES.'    Vital signs: BP (!) 145/82   Pulse 68   Temp 96.8 °F (36 °C) (Temporal)   Resp 18   LMP 06/01/2014   SpO2 100%     Allergies:  Patient has no known allergies. Medications:    Prior to Admission medications    Medication Sig Start Date End Date Taking?  Authorizing Provider   valsartan (DIOVAN) 80 MG tablet Take 80 mg by mouth daily   Yes Historical Provider, MD   metoprolol tartrate (LOPRESSOR) 50 MG tablet Take 50 mg by mouth 2 times daily   Yes Historical Provider, MD   montelukast (SINGULAIR) 10 MG tablet Take 10 mg by mouth nightly   Yes Historical Provider, MD   topiramate (TOPAMAX) 50 MG tablet Take 50 mg by mouth 2 times daily   Yes Historical Provider, MD   citalopram (CELEXA) 10 MG tablet Take 10 mg by mouth daily   Yes Historical Provider, MD   tiZANidine (ZANAFLEX) 4 MG tablet Take 4 mg by mouth every 8 hours as needed   Yes Historical Provider, MD   traZODone (DESYREL) 100 MG tablet Take 100 mg by mouth nightly   Yes Historical Provider, MD   Sennosides (SENEXON PO) Take 2 tablets by mouth nightly as needed   Yes Historical Provider, MD   vitamin D (ERGOCALCIFEROL) 1.25 MG (78857 UT) CAPS capsule Take 50,000 Units by mouth once a week   Yes Historical Provider, MD   omeprazole (PRILOSEC) 20 MG delayed release capsule Take 20 mg by mouth daily   Yes Historical Provider, MD   rizatriptan (MAXALT) 10 MG tablet Take 10 mg by mouth once as needed for Migraine May repeat in 2 hours if needed   Yes Historical Provider, MD   budesonide-formoterol (SYMBICORT) 160-4.5 MCG/ACT AERO Inhale 2 puffs into the lungs 2 times daily. 6/15/14  Yes Marleen Rodriguez MD   Levalbuterol HCl (XOPENEX IN) Inhale 2 puffs into the lungs every 6 hours as needed    Historical Provider, MD   levalbuterol (XOPENEX) 1.25 MG/0.5ML nebulizer solution Take 1 ampule by nebulization every 6 hours as needed for Wheezing. Historical Provider, MD         This is a 46 y.o. female who is pleasant, cooperative, alert and oriented x3, in no acute distress    Physical Exam:  Lungs: Bilateral equal air entry, unlabored, clear to ausculation, no wheezing, rales or rhonchi, normal effort  Cardiovascular: HR 70  normal rate, regular rhythm, no murmur, gallop, rub. Abdomen: Soft, nontender, nondistended, normal bowel sounds. OBESE   PEDAL PULSES + 2 BILATERALLY NO CALF TENDERNESS NO EDEMA  Labs:  Recent Labs     02/07/22  1237   HGB 14.5   HCT 42.2   WBC 7.2   MCV 93.6         K 3.7      CO2 18*   BUN 8   CREATININE 0.76   GLUCOSE 88   INR 1.0   PROTIME 13.5   APTT 30.2       No results for input(s): COVID19 in the last 720 hours.     RODRIGUEZ Padilla CNP   Electronically signed 2/28/2022 at 10:58 AM

## 2022-02-28 NOTE — PROGRESS NOTES
Received pt from PACU per bed. Pt is alert and oriented. C/o pain level 10. Denies any numbness or tingling to legs. CWM good. Pulses present and moderate. Admission hx gathered. Instructed pt on admission orders and pain management. Medicated with the oral long and short acting pain meds at this time. Denies nausea. Diet tray arrived and pt requested to sit at edge of bed. Pt assisted and instructed how to turn and roll as a unit. Educated about not twisting, bending. Richey in place and draining small amount of clear yellow urine. Oriented to room and environment. SR upx 2 . Call light in reach. Non slid sock on. Bed in low and locked position.

## 2022-02-28 NOTE — ANESTHESIA POSTPROCEDURE EVALUATION
Department of Anesthesiology  Postprocedure Note    Patient: Rob Wayne  MRN: [id number]  YOB: 1970  Date of evaluation: 2/28/2022  Time:  5:29 PM     Procedure Summary     Date: 02/28/22 Room / Location: [address] [place] [address] - INPATIENT    Anesthesia Start: 5239 Anesthesia Stop: 0657    Procedure: RIGHT L3-4 TLIF -2 Connie ZAVALA (Right Back) Diagnosis: (DX LUMBAR SPONDLYOLISTHEISIS)    Surgeons: Maryse Munoz MD Responsible Provider: Nadine West MD    Anesthesia Type: general ASA Status: 3          Anesthesia Type: general    Rodri Phase I: Rodri Score: 3    Rodri Phase II:      Last vitals: Reviewed and per EMR flowsheets.        Anesthesia Post Evaluation    Complications: no

## 2022-03-01 VITALS
OXYGEN SATURATION: 98 % | DIASTOLIC BLOOD PRESSURE: 64 MMHG | RESPIRATION RATE: 16 BRPM | SYSTOLIC BLOOD PRESSURE: 113 MMHG | TEMPERATURE: 97.5 F | HEART RATE: 73 BPM

## 2022-03-01 PROBLEM — M47.27 SPONDYLOSIS OF LUMBOSACRAL SPINE WITH RADICULOPATHY: Status: ACTIVE | Noted: 2022-03-01

## 2022-03-01 LAB
ABSOLUTE EOS #: <0.03 K/UL (ref 0–0.44)
ABSOLUTE IMMATURE GRANULOCYTE: 0.05 K/UL (ref 0–0.3)
ABSOLUTE LYMPH #: 0.87 K/UL (ref 1.1–3.7)
ABSOLUTE MONO #: 0.72 K/UL (ref 0.1–1.2)
ANION GAP SERPL CALCULATED.3IONS-SCNC: 9 MMOL/L (ref 9–17)
BASOPHILS # BLD: 0 % (ref 0–2)
BASOPHILS ABSOLUTE: <0.03 K/UL (ref 0–0.2)
BUN BLDV-MCNC: 9 MG/DL (ref 6–20)
BUN/CREAT BLD: 12 (ref 9–20)
CALCIUM SERPL-MCNC: 8.7 MG/DL (ref 8.6–10.4)
CHLORIDE BLD-SCNC: 107 MMOL/L (ref 98–107)
CO2: 19 MMOL/L (ref 20–31)
CREAT SERPL-MCNC: 0.74 MG/DL (ref 0.5–0.9)
EOSINOPHILS RELATIVE PERCENT: 0 % (ref 1–4)
GFR AFRICAN AMERICAN: >60 ML/MIN
GFR NON-AFRICAN AMERICAN: >60 ML/MIN
GFR SERPL CREATININE-BSD FRML MDRD: ABNORMAL ML/MIN/{1.73_M2}
GLUCOSE BLD-MCNC: 191 MG/DL (ref 70–99)
HCT VFR BLD CALC: 35.2 % (ref 36.3–47.1)
HEMOGLOBIN: 11.4 G/DL (ref 11.9–15.1)
IMMATURE GRANULOCYTES: 1 %
LYMPHOCYTES # BLD: 8 % (ref 24–43)
MCH RBC QN AUTO: 31.3 PG (ref 25.2–33.5)
MCHC RBC AUTO-ENTMCNC: 32.4 G/DL (ref 28.4–34.8)
MCV RBC AUTO: 96.7 FL (ref 82.6–102.9)
MONOCYTES # BLD: 7 % (ref 3–12)
NRBC AUTOMATED: 0 PER 100 WBC
PDW BLD-RTO: 12.7 % (ref 11.8–14.4)
PLATELET # BLD: 260 K/UL (ref 138–453)
PMV BLD AUTO: 9.9 FL (ref 8.1–13.5)
POTASSIUM SERPL-SCNC: 3.8 MMOL/L (ref 3.7–5.3)
RBC # BLD: 3.64 M/UL (ref 3.95–5.11)
SEG NEUTROPHILS: 84 % (ref 36–65)
SEGMENTED NEUTROPHILS ABSOLUTE COUNT: 8.85 K/UL (ref 1.5–8.1)
SODIUM BLD-SCNC: 135 MMOL/L (ref 135–144)
WBC # BLD: 10.5 K/UL (ref 3.5–11.3)

## 2022-03-01 PROCEDURE — 85025 COMPLETE CBC W/AUTO DIFF WBC: CPT

## 2022-03-01 PROCEDURE — 97535 SELF CARE MNGMENT TRAINING: CPT

## 2022-03-01 PROCEDURE — 94761 N-INVAS EAR/PLS OXIMETRY MLT: CPT

## 2022-03-01 PROCEDURE — 80048 BASIC METABOLIC PNL TOTAL CA: CPT

## 2022-03-01 PROCEDURE — 97530 THERAPEUTIC ACTIVITIES: CPT

## 2022-03-01 PROCEDURE — 97162 PT EVAL MOD COMPLEX 30 MIN: CPT

## 2022-03-01 PROCEDURE — 99232 SBSQ HOSP IP/OBS MODERATE 35: CPT | Performed by: FAMILY MEDICINE

## 2022-03-01 PROCEDURE — 6360000002 HC RX W HCPCS: Performed by: ORTHOPAEDIC SURGERY

## 2022-03-01 PROCEDURE — 6370000000 HC RX 637 (ALT 250 FOR IP): Performed by: ORTHOPAEDIC SURGERY

## 2022-03-01 PROCEDURE — 36415 COLL VENOUS BLD VENIPUNCTURE: CPT

## 2022-03-01 PROCEDURE — 94640 AIRWAY INHALATION TREATMENT: CPT

## 2022-03-01 PROCEDURE — 97116 GAIT TRAINING THERAPY: CPT

## 2022-03-01 PROCEDURE — 97166 OT EVAL MOD COMPLEX 45 MIN: CPT

## 2022-03-01 PROCEDURE — 2580000003 HC RX 258: Performed by: ORTHOPAEDIC SURGERY

## 2022-03-01 RX ORDER — VALSARTAN 80 MG/1
40 TABLET ORAL DAILY
Status: DISCONTINUED | OUTPATIENT
Start: 2022-03-02 | End: 2022-03-01 | Stop reason: HOSPADM

## 2022-03-01 RX ORDER — MORPHINE SULFATE 15 MG/1
15 TABLET, FILM COATED, EXTENDED RELEASE ORAL EVERY 12 HOURS SCHEDULED
Qty: 6 TABLET | Refills: 0 | Status: SHIPPED | OUTPATIENT
Start: 2022-03-01 | End: 2022-03-04

## 2022-03-01 RX ORDER — OXYCODONE HYDROCHLORIDE AND ACETAMINOPHEN 5; 325 MG/1; MG/1
1-2 TABLET ORAL EVERY 4 HOURS PRN
Qty: 60 TABLET | Refills: 0 | Status: SHIPPED | OUTPATIENT
Start: 2022-03-01 | End: 2022-03-08

## 2022-03-01 RX ADMIN — CEFAZOLIN SODIUM 2000 MG: 10 INJECTION, POWDER, FOR SOLUTION INTRAVENOUS at 03:23

## 2022-03-01 RX ADMIN — BUDESONIDE AND FORMOTEROL FUMARATE DIHYDRATE 2 PUFF: 160; 4.5 AEROSOL RESPIRATORY (INHALATION) at 07:49

## 2022-03-01 RX ADMIN — OXYCODONE AND ACETAMINOPHEN 2 TABLET: 5; 325 TABLET ORAL at 14:28

## 2022-03-01 RX ADMIN — FAMOTIDINE 20 MG: 20 TABLET, FILM COATED ORAL at 10:59

## 2022-03-01 RX ADMIN — POLYETHYLENE GLYCOL 3350 17 G: 17 POWDER, FOR SOLUTION ORAL at 10:58

## 2022-03-01 RX ADMIN — OXYCODONE AND ACETAMINOPHEN 2 TABLET: 5; 325 TABLET ORAL at 06:04

## 2022-03-01 RX ADMIN — PANTOPRAZOLE SODIUM 40 MG: 40 TABLET, DELAYED RELEASE ORAL at 06:05

## 2022-03-01 RX ADMIN — TOPIRAMATE 50 MG: 25 TABLET, FILM COATED ORAL at 10:58

## 2022-03-01 RX ADMIN — MORPHINE SULFATE 15 MG: 15 TABLET, FILM COATED, EXTENDED RELEASE ORAL at 11:01

## 2022-03-01 RX ADMIN — MORPHINE SULFATE 4 MG: 4 INJECTION, SOLUTION INTRAMUSCULAR; INTRAVENOUS at 01:07

## 2022-03-01 RX ADMIN — DEXAMETHASONE SODIUM PHOSPHATE 6 MG: 10 INJECTION INTRAMUSCULAR; INTRAVENOUS at 03:22

## 2022-03-01 RX ADMIN — SODIUM CHLORIDE, PRESERVATIVE FREE 10 ML: 5 INJECTION INTRAVENOUS at 10:59

## 2022-03-01 RX ADMIN — DEXAMETHASONE SODIUM PHOSPHATE 6 MG: 10 INJECTION INTRAMUSCULAR; INTRAVENOUS at 11:00

## 2022-03-01 RX ADMIN — CITALOPRAM HYDROBROMIDE 10 MG: 10 TABLET ORAL at 10:59

## 2022-03-01 RX ADMIN — SODIUM CHLORIDE: 9 INJECTION, SOLUTION INTRAVENOUS at 03:22

## 2022-03-01 RX ADMIN — SENNOSIDES AND DOCUSATE SODIUM 1 TABLET: 50; 8.6 TABLET ORAL at 10:58

## 2022-03-01 ASSESSMENT — PAIN DESCRIPTION - PAIN TYPE
TYPE: SURGICAL PAIN

## 2022-03-01 ASSESSMENT — PAIN DESCRIPTION - FREQUENCY
FREQUENCY: INTERMITTENT
FREQUENCY: CONTINUOUS
FREQUENCY: INTERMITTENT

## 2022-03-01 ASSESSMENT — PAIN DESCRIPTION - ONSET
ONSET: ON-GOING

## 2022-03-01 ASSESSMENT — ENCOUNTER SYMPTOMS
DIARRHEA: 0
NAUSEA: 0
WHEEZING: 0
CONSTIPATION: 0
VOMITING: 0
SHORTNESS OF BREATH: 0
BLOOD IN STOOL: 0
BACK PAIN: 1
CHEST TIGHTNESS: 0
ABDOMINAL PAIN: 0
COUGH: 0

## 2022-03-01 ASSESSMENT — PAIN - FUNCTIONAL ASSESSMENT
PAIN_FUNCTIONAL_ASSESSMENT: PREVENTS OR INTERFERES SOME ACTIVE ACTIVITIES AND ADLS
PAIN_FUNCTIONAL_ASSESSMENT: PREVENTS OR INTERFERES SOME ACTIVE ACTIVITIES AND ADLS

## 2022-03-01 ASSESSMENT — PAIN DESCRIPTION - LOCATION
LOCATION: BACK

## 2022-03-01 ASSESSMENT — PAIN SCALES - GENERAL
PAINLEVEL_OUTOF10: 6
PAINLEVEL_OUTOF10: 6
PAINLEVEL_OUTOF10: 8
PAINLEVEL_OUTOF10: 5
PAINLEVEL_OUTOF10: 7
PAINLEVEL_OUTOF10: 5

## 2022-03-01 ASSESSMENT — PAIN DESCRIPTION - DESCRIPTORS
DESCRIPTORS: ACHING
DESCRIPTORS: ACHING
DESCRIPTORS: DISCOMFORT;SORE

## 2022-03-01 ASSESSMENT — PAIN DESCRIPTION - ORIENTATION
ORIENTATION: LOWER

## 2022-03-01 NOTE — CARE COORDINATION
Case Management Initial Discharge Plan  Erwin Garcia,             Met with:patient to discuss discharge plans. Information verified: address, contacts, phone number, , insurance Yes  PCP: Jovanni Aden MD  Date of last visit: 2022    Insurance Provider: [employer]    Discharge Planning    Living Arrangements:  Alone   Support Systems:  Family Members    Home has 1 story apt  0 stairs to climb to get into front door, 0 stairs to climb to reach second floor  Location of bedroom/bathroom in home  - main floor    Patient able to perform ADL's:Independent    Current Services (outpatient & in home) none  DME equipment: walker  DME provider: N/A    Pharmacy: [facility] and [address] Needed:  Home Care    Patient agreeable to home care: Yes  Freedom of choice provided:  yes    Prior SNF/Rehab Placement and Facility: No  Agreeable to SNF/Rehab: No  [place] of choice provided: n/a   Evaluation: n/a    Expected Discharge date:  22  Patient expects to be discharged to: Follow Up Appointment: Best Day/ Time:      Transportation provider:   Transportation arrangements needed for discharge: No    Readmission Risk              Risk of Unplanned Readmission:  11             Does patient have a readmission risk score greater than 14?: No  If yes, follow-up appointment must be made within 7 days of discharge. Goal of Care:       Discharge Plan: Met with patient at bedside. Lives alone and will be going to [place] apt at D/C. POD #1 lumbar fusion. Abd binder in place. Pt has walker at home. Therapy recommended HHC and pt agreeable to [facility]. Ref[facility] to [facility] and they can accept pt. LAURA initiated. Post op appointment with Dr. Sally Mancera is 3-15 at 10:3am.   [place] informed of D/C today.               Electronically signed by Sapna Alberto RN on 3/1/22 at 8:54 AM EST

## 2022-03-01 NOTE — OP NOTE
Operative Note      Patient: Farrah Armenta  YOB: 1970  MRN: [id number]    Date of Procedure: 2/28/2022    SURGEON:  Montana Lott MD.     ANESTHESIA:  General endotracheal anesthesia. PREOPERATIVE DIAGNOSIS:  1. L3-4 spondylolisthesis with stenosis. POSTOPERATIVE DIAGNOSIS: 1. L3-4 spondylolisthesis with stenosis. PROCEDURES:  1. L3-4 minimally invasive transforaminal lumbar interbody      fusion  2. Insertion of interbody cage for spinal fusion at L3-4.  3. Right L3 laminectomy for spinal decompression. 4. Insertion of posterior spinal instrumentation at L3-4      utilizing NuVasive Reline pedicle screws and rods. 5. [place] of local bone for spinal fusion. 6. Use of allograft bone for spinal fusion to include DBM,       as well as crushed cancellous allograft bone. 7. Intraoperative use of C-arm fluoroscopy. ESTIMATED BLOOD LOSS:  200 mL. FLUIDS:  Per Anesthesia record. COMPLICATIONS:  None. INDICATIONS:  This is a pleasant 46year-old male with a  longstanding history of severe back pain, as well as pain  radiating down her right lower extremity. She had done extensive  conservative management to include physical therapy, medications,  pain management, all with minimal benefit. MRI did show  significant spondylolisthesis and residual stenosis at the L3-4 level  particularly on the right consistent with her symptoms. Due to  her failure of conservative management, it was discussed with her  the option of performing a decompression and fusion in an attempt  to help alleviate her symptoms. Risks were discussed including  bleeding, infection, injury to nerves, vessels, anesthetic risk,  need for possible further future surgery, as well as the  possibility for continued pain and continued symptoms, as well as  possible dural tear and possible nonunion.   She did understand  all these risks, did wish to proceed, and informed consent was  obtained. DESCRIPTION OF PROCEDURE:  The patient was taken to the operating  room, kept supine on her bed. She was intubated and placed under  general anesthesia by anesthesiologist.  She was given  preoperative antibiotic prophylaxis. Richey catheter was placed. Neuromonitoring leads were placed in her bilateral lower  extremities and neuromonitoring leads were placed in her  bilateral lower extremities. At this point, she was then placed  prone on the [facility] table. All bony prominences were padded. Eyes were kept free of any pressure and brachial plexus and  elbows were padded as well. Back was then prepped and draped in  a sterile fashion. Biplanar fluoroscopy was then brought in and  localized over the L3-4 level. Pedicles were then marked with a  marking pen bilaterally and this area was then infiltrated with  0.5% Marcaine with epinephrine. Approximately 3 cm incisions  were made lateral to the pedicles at L3-4. Dissection was  carried down through the lumbar fascia. At this point, Jamshidi  needles were used to place percutaneous screws with guidewires at  L3-4 and under standard conditions, the neuromonitoring was  utilized with the Jamshidi needles and remained stable throughout  placement and guidewires were then placed. Screws were then  placed over the guidewires and guidewires were then removed. Retractor blades were replaced with this screw shanks at the right  L3-4 and tower devices on the left side. At this point, the  retractor was then assembled at the right L3-4 level and attached  to the table arm. Medial blade was then inserted. The L3-4  facet was then exposed and osteotome was used to remove the  inferior articular process and this bone was harvested for later  use.   A bur with a bone trap was then used to further remove the  lamina on the superior articular process and this bone was again  also harvested. A Kerrison was then used to remove further bone  as well as the ligamentum flavum and expose the underlying dura,  as well as the exiting and traversing nerve roots, which were  completely freed at this point in time. Once this was done, the  disk space was then incised. Disk was then removed partially. Sizers were then utilized and it was found that a 12 x 10 x 30 mm  4-degree cage would be appropriate. The disk space prep was then  completed and bone graft was then placed into the disk space  until fully packed with bone utilizing the DBM  and the  local bone, which was previously harvested. Once this was fully  packed with bone and the cage was also packed with the same bone  mixture, the cage was then inserted under C-arm guidance into the  L3-4 disk space until it was fully seated. Once this was done,   was then removed. Normal saline with bacitracin was  used to irrigate the wound. Gel-Foam with thrombin, neuro  patties, and FloSeal were used to maintain epidural hemostasis. Once this was done, the shims were then  removed from the screw shanks. Screw heads were then placed on  the screw shanks and the screws were then fully seated with the  screwdriver. Moises was then measured and placed into the heads of  the screws and set screws were then placed and subsequently final  tightened. At this point, the Reema Sánchez was used to verify that  the decompression was complete and the retractor was then  removed. At this point, the moises was measured for the right side. It was placed on a percutaneous  and placed through the  tower devices and set screws were then placed and subsequently  final tightened. At this point, C-arm confirmed that the rods  were in good position. The tower devices were removed and moises   was removed. Final pictures were then taken in AP and  lateral views, which showed all instrumentation to be in  excellent position at L3-4.   At this point, the wounds were then  irrigated with sterile saline. Vancomycin powder was placed  followed by closure with 0-Vicryl, 2-0 Vicryl, and a running 4-0  Monocryl suture with Dermabond glue. Standard dressings were  then applied. She was then placed supine on her bed, extubated,  and taken to recovery room in stable condition.     Electronically signed by Imelda Beltre MD on 2/28/2022 at 9:21 PM

## 2022-03-01 NOTE — PROGRESS NOTES
CLINICAL PHARMACY NOTE: MEDS TO BEDS    Total # of Prescriptions Filled: 2   The following medications were delivered to the patient:  · PERCOCET 5-325  · MORPHINE SUL ER 15MG    Additional Documentation:

## 2022-03-01 NOTE — PLAN OF CARE
Problem: Pain:  Goal: Pain level will decrease  Description: Pain level will decrease  3/1/2022 1200 by Delia Grijalva RN  Outcome: Ongoing  3/1/2022 0302 by Kim Anguiano RN  Outcome: Ongoing  Goal: Control of acute pain  Description: Control of acute pain  3/1/2022 1200 by Delia Grijalva RN  Outcome: Ongoing  3/1/2022 0302 by Kim Anguiano RN  Outcome: Ongoing  Goal: Control of chronic pain  Description: Control of chronic pain  3/1/2022 1200 by Delia Grijalva RN  Outcome: Ongoing  3/1/2022 0302 by Kim Anguiano RN  Outcome: Ongoing     Problem: Falls - Risk of:  Goal: Will remain free from falls  Description: Will remain free from falls  3/1/2022 1200 by Delia Grijalva RN  Outcome: Ongoing  3/1/2022 0302 by Kim Anguiano RN  Outcome: Ongoing  Goal: Absence of physical injury  Description: Absence of physical injury  3/1/2022 1200 by Delia Grijalva RN  Outcome: Ongoing  3/1/2022 0302 by Kim Anguiano RN  Outcome: Ongoing     Problem: Infection - Surgical Site:  Goal: Will show no infection signs and symptoms  Description: Will show no infection signs and symptoms  3/1/2022 1200 by Delia Grijalva RN  Outcome: Ongoing  3/1/2022 0302 by Kim Anguiano RN  Outcome: Ongoing

## 2022-03-01 NOTE — PROGRESS NOTES
Physician Progress Note      PATIENT:               Live Ballesteros  CSN #:                  [id number]  :                       1970  ADMIT DATE:       2022 9:59 AM  DISCH DATE:  RESPONDING  PROVIDER #:        BRIAN KOLB DO          QUERY TEXT:    Pt admitted following a TLIF. Pt noted to have decreasing hemoglobin levels. If possible, please document in the progress notes and discharge summary if   you are evaluating and/or treating any of the following: The medical record reflects the following:  Risk Factors: Post OR  Clinical Indicators: Pre-op Hgb of 14.5, Post-op Hgb of 10.5, EBL of 200 cc in   OR  Treatment: IVF, labs, monitoring    Thank you,  Bruno Worrell RN  Options provided:  -- Acute blood loss anemia  -- Postoperative acute blood loss anemia  -- Dilutional anemia  -- Other - I will add my own diagnosis  -- Disagree - Not applicable / Not valid  -- Disagree - Clinically unable to determine / Unknown  -- Refer to Clinical Documentation Reviewer    PROVIDER RESPONSE TEXT:    This patient has acute blood loss anemia. Query created by:  Brandie Grimes on 3/1/2022 9:04 AM      Electronically signed by:  Demetrio Loving DO 3/1/2022 10:22 AM

## 2022-03-01 NOTE — PROGRESS NOTES
Physical Therapy    [facility]  Initial Assessment    NAME: Fredi Carney  : 1970  MRN: [id number]    Date of Service: 3/1/2022    Discharge Recommendations:  Patient would benefit from continued therapy after discharge     Pt presented to surgery on 22 for:   1. L3-4 minimally invasive transforaminal lumbar interbody      fusion  2. Insertion of interbody cage for spinal fusion at L3-4.  3. Right L3 laminectomy for spinal decompression. 4. Insertion of posterior spinal instrumentation at L3-4      utilizing NuVasive Reline pedicle screws and rods. 5. [place] of local bone for spinal fusion. 6. Use of allograft bone for spinal fusion to include DBM,       as well as crushed cancellous allograft bone secondary  L3-4 spondylolisthesis with stenosis. RN reports patient is medically stable for therapy treatment this date. Chart reviewed prior to treatment and patient is agreeable for therapy. Assessment   Body structures, Functions, Activity limitations: Decreased functional mobility ; Decreased ADL status; Decreased safe awareness;Decreased posture  Assessment: Pt tolerated session well with minimal deficits noted in bed mobility, transfers, ambulation, balance, and endurance this session. Pt to benefit with cont'd PT for functional mobility, gait & endurance training, & for pt education. Pt is appropriate to D/C home with assist.  Due to recent hospitalization and s/p TLIF, pt would benefit from additional therapy at time of discharge to ensure safety. Please refer to the AM-PAC score for current functional status. Pt would also benefit from OP PT for reconditioning per Dr Spears [place] once pt is off spinal precautions  Prognosis: Good  Decision Making: Medium Complexity  Exam: ROM, MMT, functional mobility, activity tolerance, Balance, & [employer] AM-PAC 6 Clicks Basic Mobility  Clinical Presentation: evolving  PT Education: Goals;PT Role;Plan of Care; Functional Mobility Training;Precautions;Transfer Training;Pressure Relief;Energy Conservation;General Safety;Gait Training  Patient Education: ED spinal precautions, bed mobility within spinal precautions, donning & doffing of lumbar corsett brace, posture, energy conservation & safety principles, prevention sedentary complications & home walking program  REQUIRES PT FOLLOW UP: Yes  Activity Tolerance  Activity Tolerance: Patient limited by endurance  Activity Tolerance: pt has SOB with inc distance of ambulation       Patient Diagnosis(es): The primary encounter diagnosis was Spondylolisthesis, acquired. s/p RIGHT L3 /L 4 TRANS LUMBAR INTERBODY FUSION 2/28/22. A diagnosis of Chronic bilateral low back pain with right-sided sciatica was also pertinent to this visit. has a past medical history of Anxiety, Arthritis, Asthma, Bulging disc, Colon polyp, COPD (chronic obstructive pulmonary disease) ([address] Depression, Heartburn, Hypertension, Migraines, Palpitations, Sleep apnea, Tachycardia, and Vitamin D deficiency. has a past surgical history that includes Tubal ligation; Hysterectomy; Colonoscopy; and lumbar fusion (Right, 2/28/2022).     Restrictions  Restrictions/Precautions  Restrictions/Precautions: General Precautions,Fall Risk,Surgical Protocols  Required Braces or Orthoses?: Yes  Required Braces or Orthoses  Spinal: Lumbar Corset  Position Activity Restriction  Spinal Precautions: No Bending,No Lifting,No Twisting  Other position/activity restrictions: ambulate, activity as tolerated, pt to wear brace, telemetry, RUE IV, alarms  Vision/Hearing  Vision: Impaired  Vision Exceptions: Wears glasses at all times (pt denies visual changes or sx)     Subjective  General  Chart Reviewed: Yes  Patient assessed for rehabilitation services?: Yes  Additional Pertinent Hx: anxiety, arthritis, asthma, HTN, migraines  Response To Previous Treatment: Not applicable  General Comment  Comments: RN okays PT  Subjective  Subjective: Pt agreeable to PT  Pain Screening  Patient Currently in Pain: Yes          Orientation  Orientation  Overall Orientation Status: Within Functional Limits  Social/Functional History  Social/Functional History  Lives With: Family (11 yr grand dtr)  Type of Home: Apartment  Home Layout: Two level (6 steps to get to apt; plans to stay with sister at [place] 1 level apt and no DINAH)  Home Access: Level entry (at sister's apt)  Bathroom Shower/Tub: Tub/Shower unit (at sister's apt)  Bathroom Toilet: Standard (vanity close)  Bathroom Equipment: Grab bars in [facility] chair  Home Equipment: Cane,Rolling walker  Receives Help From: Family (pt states she has a supportive sister)  ADL Assistance: Independent  Homemaking Assistance: Independent  Homemaking Responsibilities: Yes  Ambulation Assistance: Independent  Transfer Assistance: Independent  Active : Yes  Occupation: Unemployed  Type of occupation:   Leisure & Hobbies: sleeping  Additional Comments: Pt denies falls. Cognition   Cognition  Overall Cognitive Status: Exceptions  Arousal/Alertness: Appropriate responses to stimuli  Following Commands:  Follows all commands without difficulty  Attention Span: Appears intact  Memory: Appears intact  Safety Judgement: Decreased awareness of need for safety;Decreased awareness of need for assistance  Problem Solving: Assistance required to correct errors made;Assistance required to identify errors made;Decreased awareness of errors  Insights: Decreased awareness of deficits  Initiation: Requires cues for some  Sequencing: Does not require cues    Objective     Observation/Palpation  Observation: pt resting semifowlers position in bed, appears comfortable  Scar: post op low back incision covered by dressing    AROM RLE (degrees)  RLE AROM: WFL  AROM LLE (degrees)  LLE AROM : WFL  AROM RUE (degrees)  RUE General AROM: See OT assessment  AROM LUE (degrees)  LUE General AROM: See OT assessment  Strength RLE  Comment: deferred hip 2* s/p TLIF one day ago  Strength LLE  Comment: deferred hip 2* s/p TLIF one day ago  Strength RUE  Comment: see OT assessment  Strength LUE  Comment: see OT assessment  Tone RLE  RLE Tone: Normotonic  Tone LLE  LLE Tone: Normotonic  Motor Control  Gross Motor?: WFL     Bed mobility  Supine to Sit: Minimal assistance;2 Person assistance  Sit to Supine: Unable to assess (pt agreed to sit up in chair after edu on the benefits of being up OOB as able.)  Scooting: Minimal assistance;2 Person assistance  Comment: ED technique for rolling, use of UB, how to drop legs off EOB & use UB to come to sit in order to adhere to spinal precautions, MOD cues/tactile assist for proper log rolling tech, bending BLE's in bed to assist, hand placement on bed rail, pursed lip breathing, use of BUE's as able to assist with full scoot to EOB and place BLE's on floor, and awareness/assist with lines to increase safety/adhere to spinal  precautions  Transfers  Sit to Stand: Minimal Assistance  Stand to sit: Minimal Assistance  Bed to Chair: Minimal assistance  Stand Pivot Transfers: Minimal Assistance  Lateral Transfers: Minimal Assistance  Comment: MIN VC + tactile assist on correct use of upper body for safe sit/stand + to back all way back to surface with walker until she feels touch behind legs & to ensure she reaches with UB support to arms of chair  Ambulation  Ambulation?: Yes  Ambulation 1  Surface: level tile  Device: Rolling Walker  Assistance: Contact guard assistance  Quality of Gait: step to pattern, MIN cues to keep walker close at all times & to amb inside base of walker & upright posture  Gait Deviations: Decreased step length;Decreased step height  Distance: 20ft, 30ft  Comments: Pt amb to BR for toileting, Min Assistance to sit to toilet. Pt stood with Min Assistance,stood 3 minutes for pericare & to pull up brief, amb 3ft to sink for hand hygiene x 2 minutes then ambulated 25 more ft with R/walker & sat to chair with Min Assistance     Balance  Sitting - Static: Good  Sitting - Dynamic: Good  Standing - Static: Good (R/W)  Standing - Dynamic: Good;- (R/W)  Exercises  Comments: ED safe functional mobility, spinal precautions, safety & energy principles, prevention of sedentary complications & home walking program  Other exercises  Other exercises?: Yes  Other exercises 1: circulation ex's  Other exercises 2: deep breathing ex's with use of incentive spirometer including technique, frequency and purpose,    All lines intact, call light within reach, and patient positioned comfortably at end of treatment. All patient needs addressed prior to ending therapy session. Plan   Plan  Times per week: 2x/D until D/C  Current Treatment Recommendations: Functional Mobility Training,Transfer Training,ADL/Self-care Training,Gait [place] Exercise Program,Safety Education & Training,Patient/Caregiver Education & Training  Safety Devices  Type of devices: Call light within reach,Gait belt,Chair alarm in place,Left in chair,Nurse notified    G-Code       OutComes Score                                                  AM-PAC Score  AM-PAC Inpatient Mobility Raw Score : 16 (03/01/22 0842)  AM-PAC Inpatient T-Scale Score : 40.78 (03/01/22 0842)  Mobility Inpatient CMS 0-100% Score: 54.16 (03/01/22 0842)  Mobility Inpatient CMS G-Code Modifier : CK (03/01/22 1669)          Goals  Short term goals    Time Frame for Short term goals: 4 visits  Short term goal 1: Inc bed-mobility & transfers to independent to enable pt to safely get in/OOB(with spinal precautions) & chair  Short term goal 2:  Inc gait to amb 400 ft or > indep w/ RW to enable pt to return to previous level of independence & promotion of home walking program  Short term goal 3: Pt able to tolerate 30-40 min of activity to include ex within spinal precautions, NMR & functional mobility wiith device to facilitate activity tolerance to [facility] term goal 4: ED spinal precautions, bed mobility within spinal precautions, donning & doffing of lumbar corsett brace, posture, energy conservation & safety principles, prevention sedentary complications & home walking program, & issue written pt education       Therapy Time   Individual Concurrent Group Co-treatment   Time In 0757         Time Out 0844         Minutes 47+10=57              Additional 10 minutes for chart review      Treatment time: 43 minutes      [address], PT

## 2022-03-01 NOTE — DISCHARGE INSTR - COC
Continuity of Care Form    Patient Name: David Schuler   :  1970  MRN:  [id number]    Admit date:  2022  Discharge date:  3-1-2022    Code Status Order: Full Code   Advance Directives:   [facility] Documentation       Date/Time Healthcare Directive Type of Healthcare Directive Copy in [facility] [address] Agent's Name Healthcare Agent's Phone Number    22 [phone] No, patient does not have an advance directive for healthcare treatment -- -- -- -- --            Admitting Physician:  Darrin Leach MD  PCP: Marisabel Callaway MD    Discharging Nurse: [facility] Unit/Room#:   Discharging Unit Phone Number: [phone]    Emergency Contact:   Extended Emergency Contact Information  Primary Emergency Contact: Heather Schaefer  Home Phone: [phone]  Relation: Brother/Sister  Secondary Emergency Contact: Janet tate  Home Phone: [phone]  Relation: Child    Past Surgical History:  Past Surgical History:   Procedure Laterality Date    COLONOSCOPY      HYSTERECTOMY      TUBAL LIGATION         Immunization History:   Immunization History   Administered Date(s) Administered    COVID-19, Pfizer Purple top, DILUTE for use, 12+ yrs, 30mcg/0.3mL dose 05/15/2021, 2021, 2021       Active Problems:  Patient Active Problem List   Diagnosis Code    COPD without exacerbation ([facility] [address] J44.9    Asthma J45.909    Tachycardia R00.0    SOB (shortness of breath) R06.02    Acute bronchitis J20.9    Hypokalemia E87.6    Pulmonary atelectasis J98.11    Acute bronchitis J20.9    Hyperglycemia R73.9    Spondylolisthesis, acquired. s/p RIGHT L3 /L 4 TRANS LUMBAR INTERBODY FUSION 22 M43.10    Foraminal stenosis of lumbar region M48.061    Spondylisthesis M43.10    Migraines G43.909    Essential hypertension I10    Depression F32. A       Isolation/Infection:   Isolation            No Isolation          Patient Infection Status       None to display Nurse Assessment:  Last Vital Signs: BP (!) 104/59   Pulse 77   Temp 97.5 °F (36.4 °C) (Axillary)   Resp 16   LMP 06/01/2014   SpO2 95%     Last documented pain score (0-10 scale): Pain Level: 6  Last Weight:   Wt Readings from Last 1 Encounters:   02/07/22 211 lb 10.3 oz (96 kg)     Mental Status:  oriented and alert    IV Access:  - None    Nursing Mobility/ADLs:  Walking   Assisted  Transfer  Independent  Bathing  Assisted  Dressing  Independent  [address]  Independent  Med Delivery   whole    Wound Care Documentation and Therapy:        Elimination:  Continence: Bowel: Yes  Bladder: Yes  Urinary Catheter: None   Colostomy/Ileostomy/Ileal Conduit: No       Date of Last BM: ***    Intake/Output Summary (Last 24 hours) at 3/1/2022 0849  Last data filed at 3/1/2022 0609  Gross per 24 hour   Intake 1985 ml   Output 1085 ml   Net 900 ml     I/O last 3 completed shifts: In: 1985 [I.V.:1985]  Out: 1085 [Urine:935; Blood:150]    Safety Concerns: At Risk for Falls    Impairments/Disabilities:      None    Nutrition Therapy:  Current Nutrition Therapy:   - Oral Diet:  General    Routes of Feeding: Oral  Liquids: No Restrictions  Daily Fluid Restriction: no  Last Modified Barium Swallow with Video (Video Swallowing Test): not done    Treatments at the Time of Hospital Discharge:   Respiratory Treatments:   Oxygen Therapy:  is not on home oxygen therapy. Ventilator:    - No ventilator support    Rehab Therapies: Physical Therapy and Occupational Therapy  Weight Bearing Status/Restrictions: No weight bearing restirctions  Other Medical Equipment (for information only, NOT a DME order): Other Treatments: Skilled nursing assessment  Med teaching and compliance, Daily dressing change to low back; dry dressing.     Patient's personal belongings (please select all that are sent with patient):  None    RN SIGNATURE:  Electronically signed by Andreea Claros RN on 3/1/22 at 1:02 PM EST    CASE MANAGEMENT/SOCIAL WORK SECTION    Inpatient Status Date: ***    Readmission Risk Assessment Score:  Readmission Risk              Risk of Unplanned Readmission:  11           Discharging to Facility/ Agency   Name: Niels  Address:  Phone: [phone]  Fax: [phone]    Dialysis Facility (if applicable)   Name:  Address:  Dialysis Schedule:  Phone:  Fax:    / signature: Electronically signed by Daniel Edouard RN on 3/1/22 at 9:28 AM EST    PHYSICIAN SECTION    Prognosis: {Prognosis:4266847130}    Condition at Discharge: [address] Patient Condition:849757621}    Rehab Potential (if transferring to Rehab): {Prognosis:8039381276}    Recommended Labs or Other Treatments After Discharge: ***    Physician Certification: I certify the above information and transfer of Giulia Holloway  is necessary for the continuing treatment of the diagnosis listed and that she requires {Admit to Appropriate Level of Care:82336} for {GREATER/LESS:605951539} 30 days.      Update Admission H&P: {CHP DME Changes in SGOSA:609958393}    PHYSICIAN SIGNATURE: Phone order Dr Nancy Tadeo ([phone])

## 2022-03-01 NOTE — PROGRESS NOTES
Physical Therapy  Facility/Department: [facility]  Daily Treatment Note  NAME: Panda Cuevas  : 1970  MRN: [id number]    Date of Service: 3/1/2022    Discharge Recommendations:  Patient would benefit from continued therapy after discharge        Assessment   Body structures, Functions, Activity limitations: Decreased functional mobility ; Decreased ADL status; Decreased safe awareness;Decreased posture  Assessment: Pt tolerated session well with deficits noted in bed mobility, transfers, ambulation, balance, and safety awareness. Pt Ed spinal precautions, bed mobility within spinal precautions, donning & doffing of lumbar corsett brace, posture, energy conservation & safety awareness, prevention sedentary complications & home walking program.  Pt is appropriate to D/C home with assist.  Due to recent hospitalization and s/p TLIF, pt would benefit from additional therapy at time of discharge to ensure safety. Please refer to the AM-PAC score for current functional status. Pt would also benefit from OP PT for reconditioning per Dr Magda Jimenez once pt is off spinal precautions  Prognosis: Good  Decision Making: Medium Complexity  Exam: ROM, MMT, functional mobility, activity tolerance, Balance, & [employer] AM-PAC 6 Clicks Basic Mobility  Clinical Presentation: evolving  PT Education: Goals;PT Role;Plan of Care; Functional Mobility Training;Precautions;Transfer Training;Pressure Relief;Energy Conservation;General Safety;Gait Training  Patient Education: ED spinal precautions, bed mobility within spinal precautions, donning & doffing of lumbar corsett brace, posture, energy conservation & safety principles, prevention sedentary complications & home walking program(Pt issued written pt education & demonstrated GOOD carryover)  REQUIRES PT FOLLOW UP: Yes  Activity Tolerance  Activity Tolerance: Patient Tolerated treatment well  Activity Tolerance: pt has SOB with inc distance of ambulation     Patient Diagnosis(es): The primary encounter diagnosis was Spondylolisthesis, acquired. s/p RIGHT L3 /L 4 TRANS LUMBAR INTERBODY FUSION 2/28/22. A diagnosis of Chronic bilateral low back pain with right-sided sciatica was also pertinent to this visit. has a past medical history of Anxiety, Arthritis, Asthma, Bulging disc, Colon polyp, COPD (chronic obstructive pulmonary disease) ([address] Depression, Heartburn, Hypertension, Migraines, Palpitations, Sleep apnea, Tachycardia, and Vitamin D deficiency. has a past surgical history that includes Tubal ligation; Hysterectomy; Colonoscopy; and lumbar fusion (Right, 2/28/2022). Restrictions  Restrictions/Precautions  Restrictions/Precautions: General Precautions,Fall Risk,Surgical Protocols  Required Braces or Orthoses?: Yes  Required Braces or Orthoses  Spinal: Lumbar Corset  Position Activity Restriction  Spinal Precautions: No Bending,No Lifting,No Twisting  Other position/activity restrictions: ambulate, activity as tolerated, pt to wear brace, telemetry, RUE IV, alarms  Subjective   General  Chart Reviewed: Yes  Additional Pertinent Hx: anxiety, arthritis, asthma, HTN, migraines  Response To Previous Treatment: Not applicable  Subjective  Subjective: Pt agreeable to PT  General Comment  Comments: RN enrique PT          Orientation  Orientation  Overall Orientation Status: Within Functional Limits  Cognition   Cognition  Overall Cognitive Status: WFL  Arousal/Alertness: Appropriate responses to stimuli  Following Commands:  Follows all commands without difficulty  Attention Span: Appears intact  Memory: Appears intact  Safety Judgement: Decreased awareness of need for safety;Decreased awareness of need for assistance  Problem Solving: Assistance required to correct errors made;Assistance required to identify errors made;Decreased awareness of errors  Insights: Decreased awareness of deficits  Initiation: Requires cues for some  Sequencing: Does not require cues  Objective Bed mobility  Supine to Sit: Minimal assistance;2 Person assistance  Sit to Supine: Unable to assess (pt agreed to sit up in chair after edu on the benefits of being up OOB as able.)  Scooting: Minimal assistance;2 Person assistance  Comment: ED technique for possitioing hips high to [facility], & center buttocks back in center of bed, use of UB, how to bring legs back into bed, & use UB to transition from sit to side lying, to adhere to spinal precautions, MIN cues/tactile assist for proper tech  Transfers  Sit to Stand: Minimal Assistance;Contact guard assistance  Stand to sit: Minimal Assistance;Contact guard assistance  Bed to Chair: Minimal assistance;Contact guard assistance  Stand Pivot Transfers: Stand by assistance  Lateral Transfers: Stand by assistance  Comment: Pt demonsrated correct use of upper body for safe sit/stand + to back all way back to surface with walker until she feels touch behind legs & to ensure she reaches with UB support to arms of chair  Ambulation  Ambulation?: Yes  More Ambulation?: Yes  Ambulation 1  Surface: level tile  Device: Rolling Walker  Assistance: Contact guard assistance  Quality of Gait: step to pattern, MIN cues to keep walker close at all times & to amb inside base of walker & upright posture  Gait Deviations: Decreased step length;Decreased step height  Distance: 20ftx2  Comments: Pt amb to BR for toileting, Stand by Assistance to sit to toilet.  Pt stood with Stand by Assistance,stood 3 minutes for pericare & to pull up brief, amb 3ft to sink for hand hygiene x 2 minutes then ambulated 20 more ft with R/walker & sat to chair with Stand by Assistance  Ambulation 2  Surface - 2: level tile  Device 2: [address] [facility] 2: Stand by assistance  Quality of Gait 2: step through pattern with good awareness of upright posture & safe technique with R/walker  Gait Deviations: Slow April  Distance: 400ft     Balance  Sitting - Static: Good  Sitting - Dynamic: Good  Standing - Static: Good (R/W)  Standing - Dynamic: Good (R/W)  Exercises  Comments: ED safe functional mobility, spinal precautions, safety & energy principles, prevention of sedentary complications & home walking program  Other exercises  Other exercises?: Yes  Other exercises 1: circulation ex's  Other exercises 2: deep breathing ex's with use of incentive spirometer including technique, frequency and purpose,                    G-Code     OutComes Score                                                     AM-PAC Score  AM-PAC Inpatient Mobility Raw Score : 21 (03/01/22 1345)  AM-PAC Inpatient T-Scale Score : 50.25 (03/01/22 1345)  Mobility Inpatient CMS 0-100% Score: 28.97 (03/01/22 1345)  Mobility Inpatient CMS G-Code Modifier : CJ (03/01/22 1345)          Goals  Short term goals  Time Frame for Short term goals: 4 visits  Short term goal 1: Inc bed-mobility & transfers to independent to enable pt to safely get in/OOB(with spinal precautions) & chair  Short term goal 2:  Inc gait to amb 400 ft or > indep w/ RW to enable pt to return to previous level of independence & promotion of home walking program  Short term goal 3: Pt able to tolerate 30-40 min of activity to include ex within spinal precautions, NMR & functional mobility wiith device to facilitate activity tolerance to [facility]  Short term goal 4: ED spinal precautions, bed mobility within spinal precautions, donning & doffing of lumbar corsett brace, posture, energy conservation & safety principles, prevention sedentary complications & home walking program, & issue written pt education    Plan    Plan  Times per week: 2x/D until D/C  Current Treatment Recommendations: Functional Mobility Training,Transfer Training,ADL/Self-care Training,Gait Fauzia Mas Exercise Program,Safety Education & Training,Patient/Caregiver Education & Training  Safety Devices  Type of devices: Call light within reach,Gait belt,Chair alarm in place,Left in chair,Left in bed Therapy Time   Individual Concurrent Group Co-treatment   Time In 1251         Time Out 1345         Minutes 47                 BUCKY RM, PT

## 2022-03-01 NOTE — PROGRESS NOTES
[facility]  Attending Progress Note  3/1/2022  7:41 AM     Ana Luisa Palomino    1970   [id number]      SUBJECTIVE:  Doing well. Pain controlled. Has not been up yet. Denies leg symptoms. No CP/SOB    OBJECTIVE      Physical      VITALS:  BP (!) 104/59   Pulse 77   Temp 97.7 °F (36.5 °C) (Oral)   Resp 16   LMP 06/01/2014   SpO2 96%     Dressing C/D/I    NEUROLOGIC: Alert and Oriented x 3. Strength 5/5 HF, 5/5 Q, 5/5 TA, 5/5 EHL, 5/5 GS. 5/5 D, 5/5 B, 5/5 T, 5/5 WE, 5/5 WF, 5/5 I                                                                  Sensation intact.      Data  CBC:   Lab Results   Component Value Date    WBC 10.5 03/01/2022    RBC 3.64 03/01/2022    HGB 11.4 03/01/2022    HCT 35.2 03/01/2022    MCV 96.7 03/01/2022    MCH 31.3 03/01/2022    MCHC 32.4 03/01/2022    RDW 12.7 03/01/2022     03/01/2022    MPV 9.9 03/01/2022     BMP:    Lab Results   Component Value Date     03/01/2022    K 3.8 03/01/2022     03/01/2022    CO2 19 03/01/2022    BUN 9 03/01/2022    LABALBU 4.1 08/21/2015    CREATININE 0.74 03/01/2022    CALCIUM 8.7 03/01/2022    GFRAA >60 03/01/2022    LABGLOM >60 03/01/2022    GLUCOSE 191 03/01/2022    GLUCOSE 82 03/27/2012           Current Inpatient Medications    Current Facility-Administered Medications: budesonide-formoterol (SYMBICORT) 160-4.5 MCG/ACT inhaler 2 puff, 2 puff, Inhalation, BID  citalopram (CELEXA) tablet 10 mg, 10 mg, Oral, Daily  levalbuterol (XOPENEX) nebulizer solution 1.25 mg, 1 ampule, Nebulization, Q6H PRN  metoprolol tartrate (LOPRESSOR) tablet 50 mg, 50 mg, Oral, BID  montelukast (SINGULAIR) tablet 10 mg, 10 mg, Oral, Nightly  pantoprazole (PROTONIX) tablet 40 mg, 40 mg, Oral, QAM AC  tiZANidine (ZANAFLEX) tablet 4 mg, 4 mg, Oral, Q8H PRN  topiramate (TOPAMAX) tablet 50 mg, 50 mg, Oral, BID  traZODone (DESYREL) tablet 100 mg, 100 mg, Oral, Nightly  valsartan (DIOVAN) tablet 80 mg, 80 mg, Oral, Daily  0.9 % sodium chloride infusion, , IntraVENous, Continuous  sodium chloride flush 0.9 % injection 5-40 mL, 5-40 mL, IntraVENous, 2 times per day  sodium chloride flush 0.9 % injection 5-40 mL, 5-40 mL, IntraVENous, PRN  0.9 % sodium chloride infusion, 25 mL, IntraVENous, PRN  morphine (PF) injection 2 mg, 2 mg, IntraVENous, Q2H PRN **OR** morphine sulfate (PF) injection 4 mg, 4 mg, IntraVENous, Q2H PRN  famotidine (PEPCID) tablet 20 mg, 20 mg, Oral, BID **OR** famotidine (PEPCID) injection 20 mg, 20 mg, IntraVENous, BID  hydrOXYzine (ATARAX) tablet 10 mg, 10 mg, Oral, Q8H PRN  promethazine (PHENERGAN) tablet 12.5 mg, 12.5 mg, Oral, Q6H PRN **OR** ondansetron (ZOFRAN) injection 4 mg, 4 mg, IntraVENous, Q6H PRN  polyethylene glycol (GLYCOLAX) packet 17 g, 17 g, Oral, Daily  sennosides-docusate sodium (SENOKOT-S) 8.6-50 MG tablet 1 tablet, 1 tablet, Oral, BID  oxyCODONE-acetaminophen (PERCOCET) 5-325 MG per tablet 1 tablet, 1 tablet, Oral, Q4H PRN **OR** oxyCODONE-acetaminophen (PERCOCET) 5-325 MG per tablet 2 tablet, 2 tablet, Oral, Q4H PRN  dexamethasone (PF) (DECADRON) injection 6 mg, 6 mg, IntraVENous, Q8H  morphine (MS CONTIN) extended release tablet 15 mg, 15 mg, Oral, 2 times per day    ASSESSMENT AND PLAN    46 y.o. female status post L3-4 MIS TLIF post op day #  1    1. PT- WBAT  2. Pain control  3. EPC  4. D/C plan for home today  5.  Has walker at home    MD Alonzo WhitfieldDr. Dan C. [facility]  Spine Surgeon  [phone]

## 2022-03-01 NOTE — CONSULTS
[facility]  Office: [address], DO, Shama March, DO, Jace Parris, DO, Roberta Rodrigue Blood, DO, Moy Portillo MD, Fawad Holt MD, Hayde Bailey MD, Celso Li MD, Nazario Mejía MD, Zane Fitch MD, Elan Osborn MD, Chantal Orozco, DO, Dejah Tam, DO, Sathish Hankins MD,  Silvina Hobbs, DO, Elana Mac MD, Jasmyne Jansen MD, Loan Diana MD, Yari Justice MD, Maddie Chavarria MD, [place], [facility], [facility], CNP, Sam Guzman, CNP, [facility] Jacobo, CNS, Khanh Becerra, CNP, Leticia Florian, CNP, Gurpreet Porter, CNP, Terrence Osuna, CNP, Judy Alcantara, CNP, Alexis Worley PA-C, [facility], Claudette Anis[facility], Daquan Solitario, CNP, Ruddy Monteiro, CNP, Ashutosh Claros, CNP, Karen Marshall, CNP, Dipika Borges, CNP, Riri Palacios, [address] / HISTORY AND PHYSICAL EXAMINATION            Date:   2/28/2022  Patient name:  Ryan Wood  Date of admission:  2/28/2022  9:59 AM  MRN:   [id number]  Account:  [de-identified]  YOB: 1970  PCP:    Foreign Zeng MD  Room:   2015/2015-01  Code Status:    Full Code    Physician Requesting Consult: Michelle Branch MD    Reason for Consult: Medical management    Chief Complaint:     Postop    History Obtained From:     patient, electronic medical record    History of Present Illness:     42-year-old female with history of chronic back pain is status post right L3/L4 translumbar interbody fusion with Dr. Michele García today. On my exam she continues to complain of low mid and right back pain. Pain does not radiate. No numbness or tingling. She has a Richey catheter in that is draining yellow urine. She has not yet had a bowel movement. She is not passing gas.       Past Medical History:     Past Medical History:   Diagnosis Date    Anxiety     Arthritis     osteoarthritis    Asthma     Bulging disc     Colon polyp     COPD (chronic obstructive pulmonary disease) (HCC)     Depression     Heartburn     Hypertension     Migraines     Palpitations     Sleep apnea     has a cpap mask but doesn't wear    Tachycardia     Vitamin D deficiency         Past Surgical History:     Past Surgical History:   Procedure Laterality Date    COLONOSCOPY      HYSTERECTOMY      TUBAL LIGATION          Medications Prior to Admission:     Prior to Admission medications    Medication Sig Start Date End Date Taking? Authorizing Provider   valsartan (DIOVAN) 80 MG tablet Take 80 mg by mouth daily   Yes Historical Provider, MD   metoprolol tartrate (LOPRESSOR) 50 MG tablet Take 50 mg by mouth 2 times daily   Yes Historical Provider, MD   montelukast (SINGULAIR) 10 MG tablet Take 10 mg by mouth nightly   Yes Historical Provider, MD   topiramate (TOPAMAX) 50 MG tablet Take 50 mg by mouth 2 times daily   Yes Historical Provider, MD   citalopram (CELEXA) 10 MG tablet Take 10 mg by mouth daily   Yes Historical Provider, MD   tiZANidine (ZANAFLEX) 4 MG tablet Take 4 mg by mouth every 8 hours as needed   Yes Historical Provider, MD   traZODone (DESYREL) 100 MG tablet Take 100 mg by mouth nightly   Yes Historical Provider, MD   Sennosides (SENEXON PO) Take 2 tablets by mouth nightly as needed   Yes Historical Provider, MD   vitamin D (ERGOCALCIFEROL) 1.25 MG (98791 UT) CAPS capsule Take 50,000 Units by mouth once a week   Yes Historical Provider, MD   omeprazole (PRILOSEC) 20 MG delayed release capsule Take 20 mg by mouth daily   Yes Historical Provider, MD   rizatriptan (MAXALT) 10 MG tablet Take 10 mg by mouth once as needed for Migraine May repeat in 2 hours if needed   Yes Historical Provider, MD   budesonide-formoterol (SYMBICORT) 160-4.5 MCG/ACT AERO Inhale 2 puffs into the lungs 2 times daily.  6/15/14  Yes Padmini Maldonado MD   Levalbuterol HCl (XOPENEX IN) Inhale 2 puffs into the lungs every 6 hours as needed    Historical Provider, MD   levalbuterol [facility]) Rate and Rhythm: Normal rate and regular rhythm. Pulmonary:      Effort: No respiratory distress. Breath sounds: Normal breath sounds. Abdominal:      General: There is no distension. Palpations: Abdomen is soft. Tenderness: There is no abdominal tenderness. Musculoskeletal:      Right lower leg: No edema. Left lower leg: No edema. Skin:     General: Skin is warm and dry. Neurological:      General: No focal deficit present. Mental Status: She is alert and oriented to person, place, and time. Psychiatric:         Mood and Affect: Mood normal.         Behavior: Behavior normal.         Investigations:      Laboratory Testing:  No results found for this or any previous visit (from the past 24 hour(s)). Imaging/Diagonstics:  No results found. Assessment :      Hospital Problems           Last Modified POA    * (Principal) Spondylolisthesis, acquired. s/p RIGHT L3 /L 4 TRANS LUMBAR INTERBODY FUSION 2/28/22 2/28/2022 Yes    COPD without exacerbation ([address] 2/28/2022 Yes    Foraminal stenosis of lumbar region (Chronic) 2/28/2022 Yes    Spondylisthesis 2/28/2022 Yes    Migraines 2/28/2022 Yes    Essential hypertension 2/28/2022 Yes    Depression 2/28/2022 Yes          Plan:     1. Cholelithiasis/postop-pain meds and activity per Ortho. PT/OT. Neurovascular checks. Continue Ancef for 2 doses  2. Essential hypertension-monitoring control blood pressure. Lopressor and Diovan  3. COPD without exacerbation-supplemental oxygen as needed. Symbicort  4. Migraines-Topamax  5. Depression-trazodone. Celexa  6.  DVT prophylax    Consultations:   IP CONSULT TO HOSPITALIST      RODRIGUEZ JIN - CNP  2/28/2022  9:15 PM    Copy sent to Dr. Elif Vuong MD

## 2022-03-01 NOTE — PLAN OF CARE
Problem: Pain:  Goal: Pain level will decrease  Description: Pain level will decrease  3/1/2022 0302 by Zan Manuel RN  Outcome: Ongoing     Problem: Pain:  Goal: Control of acute pain  Description: Control of acute pain  3/1/2022 0302 by Zan Manuel RN  Outcome: Ongoing     Problem: Pain:  Goal: Control of chronic pain  Description: Control of chronic pain  3/1/2022 0302 by Zan Manuel RN  Outcome: Ongoing     Problem: Falls - Risk of:  Goal: Will remain free from falls  Description: Will remain free from falls  3/1/2022 0302 by Zan Manuel RN  Outcome: Ongoing     Problem: Falls - Risk of:  Goal: Absence of physical injury  Description: Absence of physical injury  3/1/2022 0302 by Zan Manuel RN  Outcome: Ongoing     Problem: Infection - Surgical Site:  Goal: Will show no infection signs and symptoms  Description: Will show no infection signs and symptoms  3/1/2022 0302 by Zan Manuel RN  Outcome: Ongoing

## 2022-03-01 NOTE — PROGRESS NOTES
[facility]  Office: [address], DO, Brayan Blanchard, DO, Farida Boclaude, DO, Linda Parmargeneva Blood, DO, Elissa Yun MD, Wendy Adams MD, James Hernandez MD, Nicole Nuñez MD, Angy Chavez MD, Aime Cummins MD, Radhika Morrissey MD, Phill Brown, DO, Everardo Garibay, DO, Michael Mixon MD,  Cameron Mccoy, DO, Philemon Simmonds, MD, Marlow Cowden, MD, Linda Boone MD, Aftab Morales MD, Scott Sherman MD, Caitlin Cano, [facility], [facility], [facility], Smita Wang, CNP, Cassius Harrison, CNS, Cristin Ackerman, CNP, Uche Powell, CNP, Adam Keenan, CNP, Armando Scott, CNP, Vikas Méndez, CNP, Camelia Reyes PA-C, Reyna Urban, [facility], Beverly Esqueda, [facility], Sean Jo, CNP, Baldwin Essex, CNP, Sunny Valencia, CNP, Jeanna Able, CNP, Servando Epstein, CNP, Gaynelle Pallas, [facility]    Progress Note    3/1/2022    10:44 AM    Name:   Erwin Garcia  MRN:     [id number]     [place]:      [de-identified]   Room:   2015/2015-01   Day:  1  Admit Date:  2/28/2022  9:59 AM    PCP:   Jovanni Aden MD  Code Status:  Full Code    Subjective:     C/C: Back pain   Interval History Status: improved. Patient seen and examined at bedside, no acute events overnight. Her pain is controlled  Her BP is on the soft side, it is usually elevated at home   Patient denies any chest pain, shortness of breath, chills, fevers, nausea or vomiting. Patient vitals, labs and all providers notes were reviewed,from overnight shift and morning updates were noted and discussed with the nurse    Brief History:     72-year-old female with history of chronic back pain is status post right L3/L4 translumbar interbody fusion with Dr. Sally Mancera today. On my exam she continues to complain of low mid and right back pain. Pain does not radiate. No numbness or tingling. She has a Richey catheter in that is draining yellow urine. She has not yet had a bowel movement.   She is not passing gas.         Review of Systems:     Review of Systems   Constitutional: Positive for activity change and appetite change. Negative for chills, diaphoresis and fever. HENT: Negative for congestion. Eyes: Negative for visual disturbance. Respiratory: Negative for cough, chest tightness, shortness of breath and wheezing. Cardiovascular: Negative for chest pain, palpitations and leg swelling. Gastrointestinal: Negative for abdominal pain, blood in stool, constipation, diarrhea, nausea and vomiting. Genitourinary: Negative for difficulty urinating. Musculoskeletal: Positive for arthralgias and back pain. Neurological: Positive for weakness. Negative for dizziness, light-headedness, numbness and headaches. All other systems reviewed and are negative. Medications:      Allergies:  No Known Allergies    Current Meds:   Scheduled Meds:    budesonide-formoterol  2 puff Inhalation BID    citalopram  10 mg Oral Daily    metoprolol tartrate  50 mg Oral BID    montelukast  10 mg Oral Nightly    pantoprazole  40 mg Oral QAM AC    topiramate  50 mg Oral BID    traZODone  100 mg Oral Nightly    valsartan  80 mg Oral Daily    sodium chloride flush  5-40 mL IntraVENous 2 times per day    famotidine  20 mg Oral BID    Or    famotidine (PEPCID) injection  20 mg IntraVENous BID    polyethylene glycol  17 g Oral Daily    sennosides-docusate sodium  1 tablet Oral BID    dexamethasone  6 mg IntraVENous Q8H    morphine  15 mg Oral 2 times per day     Continuous Infusions:    sodium chloride 125 mL/hr at 03/01/22 0322    sodium chloride       PRN Meds: levalbuterol, tiZANidine, sodium chloride flush, sodium chloride, morphine **OR** morphine, hydrOXYzine, promethazine **OR** ondansetron, oxyCODONE-acetaminophen **OR** oxyCODONE-acetaminophen    Data:     Past Medical History:   has a past medical history of Anxiety, Arthritis, Asthma, Bulging disc, Colon polyp, COPD (chronic obstructive pulmonary disease) ([facility] [address] Depression, Heartburn, Hypertension, Migraines, Palpitations, Sleep apnea, Tachycardia, and Vitamin D deficiency. Social History:   reports that she has quit smoking. She smoked 0.00 packs per day. She has never used smokeless tobacco. She reports that she does not drink alcohol and does not use drugs. Family History: History reviewed. No pertinent family history. Vitals:  BP (!) 104/59   Pulse 77   Temp 97.5 °F (36.4 °C) (Axillary)   Resp 16   LMP 2014   SpO2 95%   Temp (24hrs), Av.8 °F (33.8 °C), Min:72.3 °F (22.4 °C), Max:99.7 °F (37.6 °C)    No results for input(s): POCGLU in the last 72 hours. I/O (24Hr): Intake/Output Summary (Last 24 hours) at 3/1/2022 1044  Last data filed at 3/1/2022 0609  Gross per 24 hour   Intake 1985 ml   Output 1085 ml   Net 900 ml       Labs:  Hematology:  Recent Labs     22  0635   WBC 10.5   RBC 3.64*   HGB 11.4*   HCT 35.2*   MCV 96.7   MCH 31.3   MCHC 32.4   RDW 12.7      MPV 9.9     Chemistry:  Recent Labs     22  0635      K 3.8      CO2 19*   GLUCOSE 191*   BUN 9   CREATININE 0.74   ANIONGAP 9   LABGLOM >60   GFRAA >60   CALCIUM 8.7   No results for input(s): PROT, LABALBU, LABA1C, G3EXQQC, O4MRNDQ, FT4, TSH, AST, ALT, LDH, GGT, ALKPHOS, LABGGT, BILITOT, BILIDIR, AMMONIA, AMYLASE, LIPASE, LACTATE, CHOL, HDL, LDLCHOLESTEROL, CHOLHDLRATIO, TRIG, VLDL, UMM81LA, PHENYTOIN, PHENYF, URICACID, POCGLU in the last 72 hours. ABG:  Lab Results   Component Value Date    PHART 7.42 2013    WMA3EMA 33 2013    PO2ART 70 2013    HBJ0GSQ 21.0 2013    K4ILYOGD 96.8 2013    FIO2 CANNULA 2015     Lab Results   Component Value Date/Time    SPECIAL NOT REPORTED 2022 12:36 PM     Lab Results   Component Value Date/Time    CULTURE NO GROWTH 2022 12:36 PM       Radiology:  No results found. Physical Examination:        Physical Exam  Vitals and nursing note reviewed. Constitutional:       General: She is not in acute distress. HENT:      Head: Normocephalic and atraumatic. Eyes:      Conjunctiva/sclera: Conjunctivae normal.      Pupils: Pupils are equal, round, and reactive to light. Cardiovascular:      Rate and Rhythm: Normal rate and regular rhythm. Heart sounds: No murmur heard. Pulmonary:      Effort: Pulmonary effort is normal. No accessory muscle usage or respiratory distress. Breath sounds: No stridor. No decreased breath sounds, wheezing, rhonchi or rales. Abdominal:      General: Bowel sounds are normal. There is no distension. Palpations: Abdomen is soft. Abdomen is not rigid. Tenderness: There is no abdominal tenderness. There is no guarding. Musculoskeletal:         General: No tenderness. Skin:     General: Skin is warm and dry. Findings: No erythema, lesion or rash. Neurological:      Mental Status: She is alert and oriented to person, place, and time. Cranial Nerves: No cranial nerve deficit. Motor: No seizure activity. Psychiatric:         Speech: Speech normal.         Behavior: Behavior normal. Behavior is cooperative. Assessment:        Hospital Problems           Last Modified POA    * (Principal) Spondylolisthesis, acquired.  s/p RIGHT L3 /L 4 TRANS LUMBAR INTERBODY FUSION 2/28/22 2/28/2022 Yes    COPD without exacerbation ([address] 2/28/2022 Yes    Foraminal stenosis of lumbar region (Chronic) 2/28/2022 Yes    Spondylisthesis 2/28/2022 Yes    Migraines 2/28/2022 Yes    Essential hypertension 2/28/2022 Yes    Depression 2/28/2022 Yes    Spondylosis of lumbosacral spine with radiculopathy 3/1/2022 Yes          Plan:        -Adjusted BP while here  -I did discuss with her to monitor her BP at home and if back up she would resume all her meds but if not and still soft she can take half dose of her BB and hold onto Diovan  -Continue pain control  - Rest of management per primary  - Thank you for the opportunity to participate in this patient care, please do not hesitate to contact me with any question.    Ok to DC from medicine stand point      Esther Busch MD  3/1/2022  10:44 AM

## 2022-03-01 NOTE — FLOWSHEET NOTE
Patient is sitting in a chair while dressed in street clothes. Patient is approachable and states that she expects to be discharged today. Patient states that she has family support who will pick her up from the hospital. Patient states that she has a [facility] background and requests a prayer. Writer offers a prayer for continued healing, peace, and rest. Patient expresses appreciation for the visit and the prayer. Spiritual Care will follow as needed.        03/01/22 1015   Encounter Summary   Services provided to: Patient   Referral/Consult From: [address] [place] [address] Children;Family members   Continue Visiting   (3/1/22)   Complexity of Encounter Moderate   Length of Encounter 15 minutes   Spiritual Assessment Completed Yes   Routine   Type Initial   Assessment Approachable   Intervention Active listening;Explored coping resources;Nurtured hope;Prayer   Outcome Expressed gratitude

## 2022-03-01 NOTE — PROGRESS NOTES
Occupational Therapy   Occupational Therapy Initial Assessment  Date: 3/1/2022   Patient Name: Alida Smith  MRN: [id number]     : 1970    RN BEBO reports patient is medically stable for therapy treatment this date. Chart reviewed prior to treatment and patient is agreeable for therapy. All lines intact and patient positioned comfortably at end of treatment. All patient needs addressed prior to ending therapy session. Due to recent hospitalization and medical condition, pt would benefit from additional therapy at time of discharge to ensure safety. Please refer to the AM-PAC score for current functional status. Date of Service: 3/1/2022    Discharge Recommendations:  Patient would benefit from continued therapy after discharge  OT Equipment Recommendations  Equipment Needed: Yes  Mobility Devices: ADL Assistive Devices  ADL Assistive Devices: Reacher;Sock-Aid Soft;Long-handled [place];Long-handled Sponge;Emergency Alert System    Assessment   Performance deficits / Impairments: Decreased functional mobility ; Decreased safe awareness;Decreased balance;Decreased ADL status; Decreased sensation;Decreased endurance;Decreased high-level IADLs;Decreased posture  Assessment: Skilled OT services are needed for this pt for teach and train of AE use with self care to increase overall ease/I and for adherence to back precautions in function.   Prognosis: Good  Decision Making: Medium Complexity  OT Education: Precautions;Plan of Care;OT Role;Transfer Training;Energy Conservation  Patient Education: safety in function, proper log rolling/bed mob tech, pursed lip breathing, benefits and recommendations for AE use with self care, recommnedations for continued therapy services and benefits of being up OOB as able  REQUIRES OT FOLLOW UP: Yes  Activity Tolerance  Activity Tolerance: Patient limited by fatigue;Patient limited by pain  Activity Tolerance: fair minus  Safety Devices  Safety Devices in place: Yes  Type of devices: Call light within reach; Chair alarm in place; Left in chair;Patient at risk for falls;Gait belt;Nurse notified (BLE's elevated on stool/pillow under to increase overall comfort/reduce pain and skin issues as able)           Patient Diagnosis(es): The primary encounter diagnosis was Spondylolisthesis, acquired. s/p RIGHT L3 /L 4 TRANS LUMBAR INTERBODY FUSION 2/28/22. A diagnosis of Chronic bilateral low back pain with right-sided sciatica was also pertinent to this visit. has a past medical history of Anxiety, Arthritis, Asthma, Bulging disc, Colon polyp, COPD (chronic obstructive pulmonary disease) ([facility] [address] Depression, Heartburn, Hypertension, Migraines, Palpitations, Sleep apnea, Tachycardia, and Vitamin D deficiency. has a past surgical history that includes Tubal ligation; Hysterectomy; Colonoscopy; and lumbar fusion (Right, 2/28/2022). PER H&P: This is Mchugh Pauling a 46 y.o. female who presents for a pre-admission testing appointment for an upcoming right L3-4 TLIF by Dr. Ethan Fuentes scheduled on 2/28/2022 at 0730 due to lumbar spondylolisthesis. The patient's chief complaint is 6-10/10 low back pain that has progressively worsened over the past many years. Low back pain is aggravated by standing and sitting long periods and is minimally relieved with rest. Patient has numbness and tingling down the right leg. Prior treatment includes physical therapy and injections. Patient fell 1 week ago in the snow - denies loss of consciousness or hitting her head. Patient states she injured her back.      Restrictions  Restrictions/Precautions  Restrictions/Precautions: General Precautions,Fall Risk,Surgical Protocols  Required Braces or Orthoses?: Yes  Required Braces or Orthoses  Spinal: Lumbar Corset  Position Activity Restriction  Spinal Precautions: No Bending,No Lifting,No Twisting  Other position/activity restrictions: ambulate, activity as tolerated, pt to wear brace, telemetry, RUE IV, alarms    Subjective   General  Chart Reviewed: Yes  Patient assessed for rehabilitation services?: Yes  Family / Caregiver Present: No  Patient Currently in Pain: Yes  Intervention List: Nurse/Physician notified  Comments / Details: Pt states 7/10 low back pain and was recently medicated. Social/Functional History  Social/Functional History  Lives With: Family (11 yr grand dtr)  Type of Home: Apartment  Home Layout: Two level (6 steps to get to apt; plans to stay with sister at [place] 1 level apt and no DINAH)  Home Access: Level entry (at sister's apt)  Bathroom Shower/Tub: Tub/Shower unit (at sister's apt)  Bathroom Toilet: Standard (vanity close)  Bathroom Equipment: Grab bars in [place] [facility]  Home Equipment: Cane,Rolling walker  Receives Help From: Family (pt states she has a supportive sister who will assist with all needs at [place])  ADL Assistance: Independent  Homemaking Assistance: Independent  Homemaking Responsibilities: Yes  Ambulation Assistance: Independent  Transfer Assistance: Independent  Active : Yes  Occupation: Unemployed  Type of occupation:   Leisure & Hobbies: sleeping  Additional Comments: Pt denies falls.        Objective   Vision: Impaired  Vision Exceptions: Wears glasses at all times (pt denies visual changes or previous sx)  Hearing: Within functional limits    Orientation  Overall Orientation Status: Within Functional Limits  Observation/Palpation  Posture: Fair (with RW)  Observation: pt resting semifowlers position in bed/appears comfortable; back brace in room and assisted pt with donning  Scar: post op low back incision covered by dressing  Balance  Sitting Balance: Stand by assistance  Standing Balance: Contact guard assistance (with RW)  Standing Balance  Time: stand kip 2-3 mins with RW  Functional Mobility  Functional - Mobility Device: Rolling Walker  Activity:  (bed to toilet, toilet to bedside chair with increased time)  Assist Level: Minimal assistance (MIN to CG)  Functional Mobility Comments: MOD cues/tactile assist for upright posture, looking up and scanning room, RW safety/mgt and staying inside AD and keeping close to body, pacing, pursed lip breathing and awareness/assist with lines to increase overall safety/reduce falls. Toilet Transfers  Toilet - Technique: Ambulating  Equipment Used: Grab bars  Toilet Transfer: Contact guard assistance;Minimal assistance  Toilet Transfers Comments: MOD cues for hand placement on grab bar, squaring self/AD up to surface prior to sitting and controlled as well as awareness/assist with lines to increase overall safety. ADL  Feeding: Independent  Grooming: Setup/SUP seated   UE Bathing: Setup;Stand by assistance (for sponge bath; washed back and pt was edu on medicated soap and which parts to wash with and not to with good understanding.)  LE Bathing: Setup; Moderate assistance  UE Dressing: Setup;Minimal assistance (with adjustment of tshirt down in back/set up for bra; max assist for brace)  LE Dressing: Setup;Maximum assistance (to thread BLE's in underwear/pants as well as B socks; min assist for clothing mgt fully up in back due to tight fitting. Cues for 1 UE support on AD to increase overall safety with standing.)  Toileting: Minimal assistance (pt was unable to urinate and notified RN; min assist with clothing mgt fully up in back)  Additional Comments: Pt was edu on recommendations and benefits of long handled bathing sponge to increase I/ease and adherence to back prec with bathing tasks. Pt states she has one at home and is in agreement. Also pt was edu on benefits and recommendations for reacher and sock aid use for self care tasks and item retrieval as well at NH to adhere to back prec/increase I.  Pt states she will speak with her sister however she is willing to help out with any needs and care at NH. Pt was edu on local resources for purchase as interested. All edu pt verbalized good understanding. Tone RUE  RUE Tone: Normotonic  Tone LUE  LUE Tone: Normotonic  Coordination  Movements Are Fluid And Coordinated: Yes     Bed mobility  Supine to Sit: Minimal assistance;2 Person assistance  Sit to Supine: Unable to assess (pt agreed to sit up in chair after edu on the benefits of being up OOB as able.)  Scooting: Minimal assistance;2 Person assistance  Comment: MOD cues/tactile assist and demo for proper log rolling tech, bending BLE's in bed to assist, hand placement on bed rail, pursed lip breathing, use of BUE's as able to assist with full scoot to EOB and place BLE's on floor, and awareness/assist with lines to increase safety/adhere to back prec. Transfers  Stand Step Transfers: Contact guard assistance;Minimal assistance (with RW)  Sit to stand: Contact guard assistance;Minimal assistance  Stand to sit: Contact guard assistance;Minimal assistance  Transfer Comments: MOD cues/tactile assist and demo for B hand placement pushing from surface seated on as well as reaching back, upright posture, pacing, scanning, RW safety/mgt, squaring self/AD up to surface prior to sitting and controlled as well as awareness/assist with lines to increase overall safety. Cognition  Overall Cognitive Status: Exceptions  Arousal/Alertness: Appropriate responses to stimuli  Following Commands:  Follows all commands without difficulty  Attention Span: Appears intact  Memory: Appears intact  Safety Judgement: Decreased awareness of need for safety;Decreased awareness of need for assistance  Problem Solving: Assistance required to correct errors made;Assistance required to identify errors made;Decreased awareness of errors  Insights: Decreased awareness of deficits  Initiation: Requires cues for some  Sequencing: Does not require cues  Perception  Overall Perceptual Status: WFL     Sensation  Overall Sensation Status: WFL        LUE AROM (degrees)  LUE AROM : WFL  RUE AROM (degrees)  RUE AROM : WFL  LUE Strength  LUE Strength Comment: BUE MMT N/T as pt had back sx however strength appears WFLS in function                   Plan   Plan  Times per week: 5x/week 1-2x/day as kip  Current Treatment Recommendations: ROM,Balance Training,Functional Mobility Training,Positioning,Safety Education & Charolett Clark Re-education,Patient/Caregiver Education & Training,Equipment Evaluation, Education, & procurement,Self-Care / Lalita Montañoz Management Training                                                  AM-PAC Score   17          Goals  Short term goals  Time Frame for Short term goals: by discharge, pt to demo  Short term goal 1: bed mob tasks with use of rail/leg lift or modified tech as needed to SUP. Short term goal 2: I with BUE ROM HEP to maintain functional use as well as back precautions with 100% adherence in function and use of handouts as needed. Short term goal 3: UB ADL to set up and LB ADL to min assist with use of AE/AD. Short term goal 4: toileting tasks with use of grab bar/AD to SUP. Short term goal 5: ADL transfers and functional mob with AD to SUP. Long term goals  Long term goal 1: Pt to stand with SUP and AD kip > 8 mins as able to reduce falls with ADL's. Long term goal 2: Pt/caregivers to be I with pressure relief, EC/WS and fall prevention tech as well as DME/AE recommendations with use of handouts. Patient Goals   Patient goals : Pt states she is hoping to go home today!        Therapy Time   Individual Concurrent Group Co-treatment   Time In 5714 (plus 10 min chart review/nursing communication)         Time Out 0845         Minutes 50=60 mins total          Timed Code Treatment Minutes: [address] [facility] Minutes       Mary Alice Garcia, OT

## 2022-10-14 ENCOUNTER — HOSPITAL ENCOUNTER (OUTPATIENT)
Age: 52
Setting detail: SPECIMEN
Discharge: HOME | End: 2022-10-14
Payer: MEDICARE

## 2022-10-14 LAB
ALBUMIN SERPL-MCNC: 4.2 G/DL (ref 3.5–5.2)
ALBUMIN/GLOBULIN RATIO: 1.4 (ref 1–2.5)
ALP BLD-CCNC: 69 U/L (ref 35–104)
ALT SERPL-CCNC: 21 U/L (ref 5–33)
ANION GAP SERPL CALCULATED.3IONS-SCNC: 14 MMOL/L (ref 9–17)
AST SERPL-CCNC: 18 U/L
BILIRUB SERPL-MCNC: 0.3 MG/DL (ref 0.3–1.2)
BUN BLDV-MCNC: 10 MG/DL (ref 6–20)
CALCIUM SERPL-MCNC: 9.2 MG/DL (ref 8.6–10.4)
CHLORIDE BLD-SCNC: 110 MMOL/L (ref 98–107)
CHOLESTEROL, FASTING: 239 MG/DL
CHOLESTEROL/HDL RATIO: 3.3
CO2: 21 MMOL/L (ref 20–31)
CREAT SERPL-MCNC: 0.8 MG/DL (ref 0.5–0.9)
GFR SERPL CREATININE-BSD FRML MDRD: >60 ML/MIN/1.73M2
GLUCOSE BLD-MCNC: 88 MG/DL (ref 70–99)
HDLC SERPL-MCNC: 73 MG/DL
LDL CHOLESTEROL: 154 MG/DL (ref 0–130)
POTASSIUM SERPL-SCNC: 4 MMOL/L (ref 3.7–5.3)
SODIUM BLD-SCNC: 145 MMOL/L (ref 135–144)
TOTAL PROTEIN: 7.3 G/DL (ref 6.4–8.3)
TRIGLYCERIDE, FASTING: 62 MG/DL
VITAMIN D 25-HYDROXY: 21.2 NG/ML

## 2022-10-14 PROCEDURE — 36415 COLL VENOUS BLD VENIPUNCTURE: CPT

## 2022-10-14 PROCEDURE — 80061 LIPID PANEL: CPT

## 2022-10-14 PROCEDURE — 80053 COMPREHEN METABOLIC PANEL: CPT

## 2022-10-14 PROCEDURE — 82306 VITAMIN D 25 HYDROXY: CPT

## 2023-04-01 ENCOUNTER — HOSPITAL ENCOUNTER (EMERGENCY)
Age: 53
Discharge: HOME | End: 2023-04-01
Attending: EMERGENCY MEDICINE
Payer: OTHER MISCELLANEOUS

## 2023-04-01 VITALS
TEMPERATURE: 97.8 F | RESPIRATION RATE: 18 BRPM | SYSTOLIC BLOOD PRESSURE: 152 MMHG | HEIGHT: 61 IN | WEIGHT: 201 LBS | BODY MASS INDEX: 37.95 KG/M2 | DIASTOLIC BLOOD PRESSURE: 93 MMHG | HEART RATE: 78 BPM | OXYGEN SATURATION: 99 %
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
## 2023-04-01 PROCEDURE — 99282 EMERGENCY DEPT VISIT SF MDM: CPT
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
## 2023-04-01 NOTE — ED PROVIDER NOTES
Essential hypertension I10    Depression F32. A    Spondylosis of lumbosacral spine with radiculopathy M47.27     SURGICAL HISTORY       Past Surgical History:   Procedure Laterality Date    COLONOSCOPY      HYSTERECTOMY (CERVIX STATUS UNKNOWN)      LUMBAR FUSION Right 2/28/2022    RIGHT L3-4 TLIF -2 CARMS, NUVASIVE performed by Ana Luisa Yañez MD at [address] [place] [address]       Discharge Medication List as of 4/1/2023 11:27 AM        CONTINUE these medications which have NOT CHANGED    Details   valsartan (DIOVAN) 80 MG tablet Take 80 mg by mouth dailyHistorical Med      metoprolol tartrate (LOPRESSOR) 50 MG tablet Take 50 mg by mouth 2 times dailyHistorical Med      montelukast (SINGULAIR) 10 MG tablet Take 10 mg by mouth nightlyHistorical Med      topiramate (TOPAMAX) 50 MG tablet Take 50 mg by mouth 2 times dailyHistorical Med      citalopram (CELEXA) 10 MG tablet Take 10 mg by mouth dailyHistorical Med      tiZANidine (ZANAFLEX) 4 MG tablet Take 4 mg by mouth every 8 hours as neededHistorical Med      traZODone (DESYREL) 100 MG tablet Take 100 mg by mouth nightlyHistorical Med      Sennosides (SENEXON PO) Take 2 tablets by mouth nightly as neededHistorical Med      vitamin D (ERGOCALCIFEROL) 1.25 MG (89221 UT) CAPS capsule Take 50,000 Units by mouth once a weekHistorical Med      Levalbuterol HCl (XOPENEX IN) Inhale 2 puffs into the lungs every 6 hours as neededHistorical Med      omeprazole (PRILOSEC) 20 MG delayed release capsule Take 20 mg by mouth dailyHistorical Med      rizatriptan (MAXALT) 10 MG tablet Take 10 mg by mouth once as needed for Migraine May repeat in 2 hours if neededHistorical Med      levalbuterol (XOPENEX) 1.25 MG/0.5ML nebulizer solution Take 1 ampule by nebulization every 6 hours as needed for Wheezing. budesonide-formoterol (SYMBICORT) 160-4.5 MCG/ACT AERO Inhale 2 puffs into the lungs 2 times daily. , Disp-1 Inhaler, R-1           ALLERGIES     has No Health  [address] [facility] [address] [facility]   [phone]  Schedule an appointment as soon as possible for a visit in 2 days        MD Oc Akhtar MD  04/01/23 1987

## 2024-04-30 ENCOUNTER — HOSPITAL ENCOUNTER (EMERGENCY)
Age: 54
Discharge: HOME | End: 2024-04-30
Attending: EMERGENCY MEDICINE
Payer: COMMERCIAL

## 2024-04-30 ENCOUNTER — APPOINTMENT (OUTPATIENT)
Dept: CT IMAGING | Age: 54
End: 2024-04-30
Payer: COMMERCIAL

## 2024-04-30 ENCOUNTER — APPOINTMENT (OUTPATIENT)
Dept: GENERAL RADIOLOGY | Age: 54
End: 2024-04-30
Payer: COMMERCIAL

## 2024-04-30 VITALS
HEIGHT: 61 IN | HEART RATE: 92 BPM | RESPIRATION RATE: 23 BRPM | TEMPERATURE: 99.1 F | BODY MASS INDEX: 45.88 KG/M2 | OXYGEN SATURATION: 100 % | DIASTOLIC BLOOD PRESSURE: 80 MMHG | SYSTOLIC BLOOD PRESSURE: 132 MMHG | WEIGHT: 243 LBS

## 2024-04-30 DIAGNOSIS — K76.9 HEPATIC LESION: ICD-10-CM

## 2024-04-30 DIAGNOSIS — E27.8 LEFT ADRENAL MASS (HCC): ICD-10-CM

## 2024-04-30 DIAGNOSIS — J10.1 INFLUENZA A: Primary | ICD-10-CM

## 2024-04-30 LAB
ALBUMIN SERPL-MCNC: 4.1 G/DL (ref 3.5–5.2)
ALBUMIN/GLOB SERPL: 1 {RATIO} (ref 1–2.5)
ALP SERPL-CCNC: 96 U/L (ref 35–104)
ALT SERPL-CCNC: 24 U/L (ref 10–35)
ANION GAP SERPL CALCULATED.3IONS-SCNC: 12 MMOL/L (ref 9–16)
APAP SERPL-MCNC: <5 UG/ML (ref 10–30)
AST SERPL-CCNC: 24 U/L (ref 10–35)
BASOPHILS # BLD: 0 K/UL (ref 0–0.2)
BASOPHILS NFR BLD: 0 % (ref 0–2)
BILIRUB SERPL-MCNC: 0.4 MG/DL (ref 0–1.2)
BILIRUB UR QL STRIP: NEGATIVE
BNP SERPL-MCNC: 82 PG/ML (ref 0–300)
BUN SERPL-MCNC: 7 MG/DL (ref 6–20)
CALCIUM SERPL-MCNC: 8.5 MG/DL (ref 8.6–10.4)
CHLORIDE SERPL-SCNC: 107 MMOL/L (ref 98–107)
CLARITY UR: CLEAR
CO2 SERPL-SCNC: 22 MMOL/L (ref 20–31)
COLOR UR: YELLOW
COMMENT: ABNORMAL
CREAT SERPL-MCNC: 0.8 MG/DL (ref 0.5–0.9)
EOSINOPHIL # BLD: 0.08 K/UL (ref 0–0.44)
EOSINOPHILS RELATIVE PERCENT: 1 % (ref 1–4)
ERYTHROCYTE [DISTWIDTH] IN BLOOD BY AUTOMATED COUNT: 13.5 % (ref 11.8–14.4)
ETHANOL PERCENT: <0.01 %
ETHANOLAMINE SERPL-MCNC: <10 MG/DL
FLUAV AG SPEC QL: POSITIVE
FLUBV AG SPEC QL: NEGATIVE
GFR SERPL CREATININE-BSD FRML MDRD: 86 ML/MIN/1.73M2
GLUCOSE SERPL-MCNC: 98 MG/DL (ref 74–99)
GLUCOSE UR STRIP-MCNC: NEGATIVE MG/DL
HCT VFR BLD AUTO: 42.3 % (ref 36.3–47.1)
HGB BLD-MCNC: 13.6 G/DL (ref 11.9–15.1)
HGB UR QL STRIP.AUTO: NEGATIVE
IMM GRANULOCYTES # BLD AUTO: 0 K/UL (ref 0–0.3)
IMM GRANULOCYTES NFR BLD: 0 %
KETONES UR STRIP-MCNC: NEGATIVE MG/DL
LACTIC ACID, WHOLE BLOOD: 2.2 MMOL/L (ref 0.7–2.1)
LEUKOCYTE ESTERASE UR QL STRIP: NEGATIVE
LIPASE SERPL-CCNC: 17 U/L (ref 13–60)
LYMPHOCYTES NFR BLD: 0.55 K/UL (ref 1.1–3.7)
LYMPHOCYTES RELATIVE PERCENT: 7 % (ref 24–43)
MCH RBC QN AUTO: 30.3 PG (ref 25.2–33.5)
MCHC RBC AUTO-ENTMCNC: 32.2 G/DL (ref 28.4–34.8)
MCV RBC AUTO: 94.2 FL (ref 82.6–102.9)
MONOCYTES NFR BLD: 0.78 K/UL (ref 0.1–1.2)
MONOCYTES NFR BLD: 10 % (ref 3–12)
MORPHOLOGY: NORMAL
NEUTROPHILS NFR BLD: 82 % (ref 36–65)
NEUTS SEG NFR BLD: 6.39 K/UL (ref 1.5–8.1)
NITRITE UR QL STRIP: NEGATIVE
NRBC BLD-RTO: 0 PER 100 WBC
PH UR STRIP: 6.5 [PH] (ref 5–8)
PLATELET # BLD AUTO: 292 K/UL (ref 138–453)
PMV BLD AUTO: 10.5 FL (ref 8.1–13.5)
POTASSIUM SERPL-SCNC: 3.7 MMOL/L (ref 3.7–5.3)
PROT SERPL-MCNC: 7.4 G/DL (ref 6.6–8.7)
PROT UR STRIP-MCNC: NEGATIVE MG/DL
RBC # BLD AUTO: 4.49 M/UL (ref 3.95–5.11)
SALICYLATES SERPL-MCNC: <0.5 MG/DL (ref 0–10)
SARS-COV-2 RDRP RESP QL NAA+PROBE: NOT DETECTED
SODIUM SERPL-SCNC: 141 MMOL/L (ref 136–145)
SP GR UR STRIP: 1.08 (ref 1–1.03)
SPECIMEN DESCRIPTION: NORMAL
TROPONIN I SERPL HS-MCNC: <6 NG/L (ref 0–14)
TROPONIN I SERPL HS-MCNC: <6 NG/L (ref 0–14)
UROBILINOGEN UR STRIP-ACNC: NORMAL EU/DL (ref 0–1)
WBC OTHER # BLD: 7.8 K/UL (ref 3.5–11.3)

## 2024-04-30 PROCEDURE — G0480 DRUG TEST DEF 1-7 CLASSES: HCPCS

## 2024-04-30 PROCEDURE — 70450 CT HEAD/BRAIN W/O DYE: CPT

## 2024-04-30 PROCEDURE — 83690 ASSAY OF LIPASE: CPT

## 2024-04-30 PROCEDURE — 73502 X-RAY EXAM HIP UNI 2-3 VIEWS: CPT

## 2024-04-30 PROCEDURE — 81003 URINALYSIS AUTO W/O SCOPE: CPT

## 2024-04-30 PROCEDURE — 2580000003 HC RX 258: Performed by: STUDENT IN AN ORGANIZED HEALTH CARE EDUCATION/TRAINING PROGRAM

## 2024-04-30 PROCEDURE — 83880 ASSAY OF NATRIURETIC PEPTIDE: CPT

## 2024-04-30 PROCEDURE — 70498 CT ANGIOGRAPHY NECK: CPT

## 2024-04-30 PROCEDURE — 71045 X-RAY EXAM CHEST 1 VIEW: CPT

## 2024-04-30 PROCEDURE — 84484 ASSAY OF TROPONIN QUANT: CPT

## 2024-04-30 PROCEDURE — 87804 INFLUENZA ASSAY W/OPTIC: CPT

## 2024-04-30 PROCEDURE — 87635 SARS-COV-2 COVID-19 AMP PRB: CPT

## 2024-04-30 PROCEDURE — 74174 CTA ABD&PLVS W/CONTRAST: CPT

## 2024-04-30 PROCEDURE — 99285 EMERGENCY DEPT VISIT HI MDM: CPT

## 2024-04-30 PROCEDURE — 83605 ASSAY OF LACTIC ACID: CPT

## 2024-04-30 PROCEDURE — 80143 DRUG ASSAY ACETAMINOPHEN: CPT

## 2024-04-30 PROCEDURE — 93005 ELECTROCARDIOGRAM TRACING: CPT | Performed by: STUDENT IN AN ORGANIZED HEALTH CARE EDUCATION/TRAINING PROGRAM

## 2024-04-30 PROCEDURE — 96375 TX/PRO/DX INJ NEW DRUG ADDON: CPT

## 2024-04-30 PROCEDURE — 80053 COMPREHEN METABOLIC PANEL: CPT

## 2024-04-30 PROCEDURE — 96374 THER/PROPH/DIAG INJ IV PUSH: CPT

## 2024-04-30 PROCEDURE — 85025 COMPLETE CBC W/AUTO DIFF WBC: CPT

## 2024-04-30 PROCEDURE — 71275 CT ANGIOGRAPHY CHEST: CPT

## 2024-04-30 PROCEDURE — 6360000004 HC RX CONTRAST MEDICATION: Performed by: STUDENT IN AN ORGANIZED HEALTH CARE EDUCATION/TRAINING PROGRAM

## 2024-04-30 PROCEDURE — 6360000002 HC RX W HCPCS: Performed by: STUDENT IN AN ORGANIZED HEALTH CARE EDUCATION/TRAINING PROGRAM

## 2024-04-30 PROCEDURE — 80179 DRUG ASSAY SALICYLATE: CPT

## 2024-04-30 RX ORDER — IBUPROFEN 600 MG/1
600 TABLET ORAL EVERY 8 HOURS PRN
Qty: 21 TABLET | Refills: 0 | Status: SHIPPED | OUTPATIENT
Start: 2024-04-30 | End: 2024-05-07

## 2024-04-30 RX ORDER — PROCHLORPERAZINE EDISYLATE 5 MG/ML
10 INJECTION INTRAMUSCULAR; INTRAVENOUS ONCE
Status: COMPLETED | OUTPATIENT
Start: 2024-04-30 | End: 2024-04-30

## 2024-04-30 RX ORDER — KETOROLAC TROMETHAMINE 30 MG/ML
30 INJECTION, SOLUTION INTRAMUSCULAR; INTRAVENOUS ONCE
Status: COMPLETED | OUTPATIENT
Start: 2024-04-30 | End: 2024-04-30

## 2024-04-30 RX ORDER — OSELTAMIVIR PHOSPHATE 75 MG/1
75 CAPSULE ORAL 2 TIMES DAILY
Qty: 10 CAPSULE | Refills: 0 | Status: SHIPPED | OUTPATIENT
Start: 2024-04-30 | End: 2024-05-05

## 2024-04-30 RX ORDER — 0.9 % SODIUM CHLORIDE 0.9 %
1000 INTRAVENOUS SOLUTION INTRAVENOUS ONCE
Status: COMPLETED | OUTPATIENT
Start: 2024-04-30 | End: 2024-04-30

## 2024-04-30 RX ORDER — DIPHENHYDRAMINE HYDROCHLORIDE 50 MG/ML
12.5 INJECTION INTRAMUSCULAR; INTRAVENOUS ONCE
Status: COMPLETED | OUTPATIENT
Start: 2024-04-30 | End: 2024-04-30

## 2024-04-30 RX ORDER — ACETAMINOPHEN 500 MG
1000 TABLET ORAL EVERY 6 HOURS PRN
Qty: 56 TABLET | Refills: 0 | Status: SHIPPED | OUTPATIENT
Start: 2024-04-30 | End: 2024-05-07

## 2024-04-30 RX ADMIN — IOPAMIDOL 175 ML: 755 INJECTION, SOLUTION INTRAVENOUS at 17:04

## 2024-04-30 RX ADMIN — KETOROLAC TROMETHAMINE 30 MG: 30 INJECTION, SOLUTION INTRAMUSCULAR; INTRAVENOUS at 19:35

## 2024-04-30 RX ADMIN — DIPHENHYDRAMINE HYDROCHLORIDE 12.5 MG: 50 INJECTION INTRAMUSCULAR; INTRAVENOUS at 15:54

## 2024-04-30 RX ADMIN — PROCHLORPERAZINE EDISYLATE 10 MG: 5 INJECTION INTRAMUSCULAR; INTRAVENOUS at 15:54

## 2024-04-30 RX ADMIN — SODIUM CHLORIDE 1000 ML: 9 INJECTION, SOLUTION INTRAVENOUS at 15:55

## 2024-04-30 ASSESSMENT — ENCOUNTER SYMPTOMS
PHOTOPHOBIA: 0
ABDOMINAL PAIN: 0
VOMITING: 0
BACK PAIN: 1
COUGH: 0
SHORTNESS OF BREATH: 0
DIARRHEA: 0
CONSTIPATION: 0
SORE THROAT: 0
NAUSEA: 1
RHINORRHEA: 0

## 2024-04-30 ASSESSMENT — PAIN SCALES - GENERAL
PAINLEVEL_OUTOF10: 10
PAINLEVEL_OUTOF10: 9

## 2024-04-30 ASSESSMENT — LIFESTYLE VARIABLES
HOW OFTEN DO YOU HAVE A DRINK CONTAINING ALCOHOL: MONTHLY OR LESS
HOW MANY STANDARD DRINKS CONTAINING ALCOHOL DO YOU HAVE ON A TYPICAL DAY: 1 OR 2

## 2024-04-30 ASSESSMENT — PAIN DESCRIPTION - PAIN TYPE: TYPE: ACUTE PAIN

## 2024-04-30 ASSESSMENT — PAIN - FUNCTIONAL ASSESSMENT: PAIN_FUNCTIONAL_ASSESSMENT: 0-10

## 2024-04-30 ASSESSMENT — PAIN DESCRIPTION - LOCATION
LOCATION: HEAD
LOCATION: HEAD

## 2024-04-30 ASSESSMENT — PAIN DESCRIPTION - FREQUENCY: FREQUENCY: CONTINUOUS

## 2024-04-30 NOTE — ED NOTES
The following labs were labeled with appropriate pt sticker and tubed to lab:     [] Blue     [] Lavender   [] on ice  [] Green/yellow  [] Green/black [] on ice  [] Grey  [] on ice  [] Yellow  [] Red  [] Pink  [] Type/ Screen  [] ABG  [] VBG    [x] COVID-19 swab    [] Rapid  [x] PCR  [x] Flu swab  [] Peds Viral Panel     [] Urine Sample  [] Fecal Sample  [] Pelvic Cultures  [] Blood Cultures  [] X 2  [] STREP Cultures  [] Wound Cultures

## 2024-04-30 NOTE — ED PROVIDER NOTES
[facility] ED  Emergency Department Encounter  Emergency Medicine Resident     Pt Name:Shanna Pierson  MRN: [id number]  Birthdate 1970  Date of evaluation: 4/30/24  PCP:  Alicia Mayo MD  Note Started: 3:31 PM EDT      CHIEF COMPLAINT       Chief Complaint   Patient presents with    Shortness of Breath    Chest Pain    Altered Mental Status    Headache    Dizziness       HISTORY OF PRESENT ILLNESS  (Location/Symptom, Timing/Onset, Context/Setting, Quality, Duration, Modifying Factors, Severity.)      Shanna Pierson is a 54 y.o. female who presents with headache, chest pain, back pain, right hip pain, right lower extremity pain, right upper extremity numbness/tingling.  Patient states that she woke this morning with a headache.  Has a history of migraines but states this headache feels different than normal.  States it is behind both of her eyes.  States that she went to work today but her headache worsened.  She began to have worsening chest pain.  Describes it as a chest pressure over the substernal area without radiation.  No nausea or vomiting.  No diaphoresis.  Has a history of chronic lumbar back pain but states that it is worse today.  Denies trauma.  No urinary or fecal incontinence.  No urinary retention.  No saddle paresthesias.  Also endorsing right hip pain with radiation down right lower extremity.  States that she has never had this before.  No history of IVDU.  No fevers or chills or recent illnesses.  Also endorsing right upper extremity paresthesias.  No neck pain.    PAST MEDICAL / SURGICAL / SOCIAL / FAMILY HISTORY      has a past medical history of Anxiety, Arthritis, Asthma, Bulging disc, Colon polyp, COPD (chronic obstructive pulmonary disease) (HCC), Depression, Heartburn, Hypertension, Migraines, Palpitations, Sleep apnea, Tachycardia, and Vitamin D deficiency.     has a past surgical history that includes Tubal ligation; Hysterectomy; Colonoscopy; and lumbar fusion  had a syncopal event at work, however the history is not clear.  Sensation and muscle strength grossly intact.  Tenderness palpation over the right hip with positive straight leg raise.  No tenderness to palpation of the abdomen.  No midline C/T/L-spine tenderness.  Lungs are clear to auscultation.  Cardiac auscultation reveals regular rate and rhythm, no murmurs.  Patient slightly hypertensive and tachypneic but otherwise vital signs stable.  Concern for migraine, intracranial mass or bleed, ACS/MI, pneumonia, pneumothorax, GERD, MSK, anxiety, aortic dissection, AAA, hip osseous abnormality, sciatica.  Will get lab work and imaging.  Will treat symptoms.  Will reevaluate.    Amount and/or Complexity of Data Reviewed  Labs: ordered. Decision-making details documented in ED Course.  Radiology: ordered. Decision-making details documented in ED Course.  ECG/medicine tests: ordered.    Risk  OTC drugs.  Prescription drug management.      EMERGENCY DEPARTMENT COURSE:  ED Course as of 05/01/24 0015   Tue Apr 30, 2024   1631 XR HIP 2-3 VW W PELVIS RIGHT [AR]   1637 XR CHEST PORTABLE [AR]   1641 Troponin, High Sensitivity: <6 [AR]   1641 Comprehensive Metabolic Panel(!):    Sodium 141   Potassium 3.7   Chloride 107   CARBON DIOXIDE 22   Anion Gap 12   Glucose, Random 98   BUN,BUNPL 7   Creatinine 0.8   Est, Glom Filt Rate 86   Calcium, Total 8.5(!)   Total Protein, Serum 7.4   Albumin 4.1   Albumin/Globulin Ratio 1.0   Total Bilirubin 0.4   Alk Phos 96   ALT 24   AST 24 [AR]   1641 Pro-BNP: 82 [AR]   1641 Lipase: 17 [AR]   1641 CBC with Auto Differential(!):    WBC 7.8   RBC 4.49   Hemoglobin Quant 13.6   Hematocrit 42.3   MCV 94.2   MCH 30.3   MCHC 32.2   RDW 13.5   Platelet Count 292   MPV 10.5   NRBC Automated 0.0   Immature Granulocytes % 0   Neutrophils % 82(!)   Lymphocyte % 7(!)   Monocytes % 10   Eosinophils % 1   Basophils % 0   Immature Granulocytes Absolute 0.00   Neutrophils Absolute 6.39   Lymphocytes Absolute

## 2024-04-30 NOTE — ED TRIAGE NOTES
Patient reports earlier today while at work had a headache come on, and as the day progressed she  felt weakness , and back pain that rated mainly on the right side   Headache she reports is 10/10 , \" pounding headache; hx of migraines, no nausea or vomiting  :   Report her chest \" feels tight\" ; reports some shortness of breath breath  No recent travel, or falls    EMS reports as stated \" when they arrived pt was c/o shortness of breath, and chest tightness, and her entire body began to shake:  and she  was lowered to the ground; and became slightly unresponsive , confused like\"     Vitals reported Bp 127/87, HR 95, SP 02 98%Ra, glucose 104     No re[ported hx of seizure  Patient was talking , in full sentences, clear speech, did appear slightly confused     Madelyn was present at bedside

## 2024-04-30 NOTE — ED PROVIDER NOTES
[facility]     Emergency Department     Faculty Attestation    I performed a history and physical examination of the patient and discussed management with the resident. I reviewed the resident’s note and agree with the documented findings including all diagnostic interpretations and plan of care. Any areas of disagreement are noted on the chart. I was personally present for the key portions of any procedures. I have documented in the chart those procedures where I was not present during the key portions. I have reviewed the emergency nurses triage note. I agree with the chief complaint, past medical history, past surgical history, allergies, medications, social and family history as documented unless otherwise noted below. Documentation of the HPI, Physical Exam and Medical Decision Making performed by mikey is based on my personal performance of the HPI, PE and MDM. For Physician Assistant/ Nurse Practitioner cases/documentation I have personally evaluated this patient and have completed at least one if not all key elements of the E/M (history, physical exam, and MDM). Additional findings are as noted.    Primary Care Physician: Alicia Mayo MD    Note Started: 3:34 PM EDT     VITAL SIGNS:   blood pressure is 145/102 (abnormal) and her pulse is 94. Her respiration is 27 and oxygen saturation is 99%.      Medical Decision Making  Chest pain, shortness of breath, episode of seizure versus syncope?.  Symptoms started earlier today.  Somewhat limited history from patient as she is intermittently somnolent.  Reports feeling chest tightness and difficulty catching her breath.  She does have history of COPD.  No history of blood clots in the lungs of the legs.  On examination she is tachypneic, mildly somnolent but will arouse easily to noxious stimuli including verbal.  Cardiac exam regular rate and rhythm no murmurs rubs gallops, pulmonary clear  bilaterally abdomen soft nontender nondistended.  Will proceed with cardiac workup, labs, consider imaging, reassess    3:42 PM EDT patient more communicative shortly after her arrival in department and reporting headache and sensation of right-sided weakness before this event.  Will proceed with neuro and vascular imaging.      Amount and/or Complexity of Data Reviewed  Independent Historian: EMS  External Data Reviewed: ECG.  Labs: ordered.  Radiology: ordered.  ECG/medicine tests: ordered and independent interpretation performed.    Risk  Prescription drug management.        EKG Interpretation  EKG Interpretation    Interpreted by me    Rhythm: normal sinus   Rate: normal  Axis: normal  Ectopy: none  Conduction: normal, borderline short [place] interval  ST Segments: no acute change  T Waves: no acute change  Q Waves: none    Clinical Impression: no acute changes, nonspecific EKG  Interpreted by me    Paddy Tapia MD, FACEP, FAAEM  Attending Emergency Physician        Paddy Tapia MD  04/30/24 1537       Paddy Tapia MD  04/30/24 1543

## 2024-05-01 NOTE — ED PROVIDER NOTES
FACULTY SIGN-OUT  ADDENDUM       Patient: Shanna Pierson   MRN: [id number]  PCP:  Alicia Mayo MD  Note Started: 4/30/24, 8:13 PM EDT  Attestation  I was available and discussed any additional care issues that arose and coordinated the management plans with the resident(s) caring for the patient during my duty period. Any areas of disagreement with resident's documentation of care or procedures are noted on the chart. I was personally present for the key portions of any/all procedures during my duty period. I have documented in the chart those procedures where I was not present during the key portions.   The patient's initial evaluation and plan have been discussed with the prior provider who initially evaluated the patient.      Pertinent Comments:  The patient is a 54 y.o. female taken in signout with complaint initially of chest pain as well as shortness of breath and headache and syncope with workup relatively negative here except for influenza A positivity.   Feeling much better at this time and ambulating around the ER normally  We are awaiting urinalysis and reevaluation    ED COURSE      The patient was given the following medications:  Orders Placed This Encounter   Medications    prochlorperazine (COMPAZINE) injection 10 mg    diphenhydrAMINE (BENADRYL) injection 12.5 mg    sodium chloride 0.9 % bolus 1,000 mL    iopamidol (ISOVUE-370) 76 % injection 175 mL    ketorolac (TORADOL) injection 30 mg       RECENT VITALS:   BP: (!) 141/82  Pulse: 100  Respirations: 25  Temp: 99.1 °F (37.3 °C) SpO2: 97 %    (Please note that portions of this note were completed with a voice recognition program.  Efforts were made to edit the dictations but occasionally words are mis-transcribed.)    German Frost MD [facility] FACE  Attending Emergency Medicine Physician       German Frost MD  04/30/24 2013

## 2024-05-01 NOTE — DISCHARGE INSTRUCTIONS
You were in the emergency department for headache, chest pain, hip pain, leg pain, arm pain.  Your lab work did not reveal any acute abnormalities.  CT scan of your abdomen did show a lesion on your liver as well as a mass on your adrenal gland.  Please follow-up with your primary care physician regarding these findings.  Radiology recommends that you get a follow-up MRI.  You were given prescriptions for Tylenol, ibuprofen, Tamiflu.  Please take these medications as prescribed.  Please return to the emergency department for any worsening symptoms, questions or concerns.

## 2024-05-03 LAB
EKG ATRIAL RATE: 93 BPM
EKG P AXIS: 37 DEGREES
EKG P-R INTERVAL: 120 MS
EKG Q-T INTERVAL: 332 MS
EKG QRS DURATION: 82 MS
EKG QTC CALCULATION (BAZETT): 412 MS
EKG R AXIS: 59 DEGREES
EKG T AXIS: 29 DEGREES
EKG VENTRICULAR RATE: 93 BPM

## 2024-05-03 PROCEDURE — 93010 ELECTROCARDIOGRAM REPORT: CPT | Performed by: INTERNAL MEDICINE

## (undated) DEVICE — SUTURE MCRYL SZ 4-0 L27IN ABSRB UD L19MM PS-2 1/2 CIR PRIM Y426H

## (undated) DEVICE — CORD,CAUTERY,BIPOLAR,STERILE: Brand: MEDLINE

## (undated) DEVICE — NEEDLE NRV STIM BVL TIP

## (undated) DEVICE — K WIRE FIX NIT BVL BLNT TIP PRECEPT
Type: IMPLANTABLE DEVICE | Status: NON-FUNCTIONAL
Removed: 2022-02-28

## (undated) DEVICE — Device

## (undated) DEVICE — TUBING, SUCTION, 1/4" X 12', STRAIGHT: Brand: MEDLINE

## (undated) DEVICE — 1010 S-DRAPE TOWEL DRAPE 10/BX: Brand: STERI-DRAPE™

## (undated) DEVICE — DRAPE EQUIP CARM PK SNAP OEC/GE 9800 PLAS STRL

## (undated) DEVICE — RETRACTOR

## (undated) DEVICE — GLOVE SURG SZ 85 CRM LTX FREE POLYISOPRENE POLYMER BEAD ANTI

## (undated) DEVICE — CABLE FIX TRANSFORAMINAL LUM INTBDY FUS LT SELF RET MAS
Type: IMPLANTABLE DEVICE | Status: NON-FUNCTIONAL
Removed: 2022-02-28

## (undated) DEVICE — JCKSON TBL POSTNER NO HD REST: Brand: MEDLINE INDUSTRIES, INC.

## (undated) DEVICE — YANKAUER,FLEXIBLE HANDLE,REGLR CAPACITY: Brand: MEDLINE INDUSTRIES, INC.

## (undated) DEVICE — TRAP,MUCUS SPECIMEN, 80CC: Brand: MEDLINE

## (undated) DEVICE — SUTURE VCRL SZ 2-0 L36IN ABSRB UD L36MM CT-1 1/2 CIR J945H

## (undated) DEVICE — ADHESIVE SKIN CLOSURE TOP 36 CC HI VISC DERMBND MINI

## (undated) DEVICE — COVER,TABLE,HEAVY DUTY,50"X90",STRL: Brand: MEDLINE

## (undated) DEVICE — INSERT CUSHION HEAD PRONEVIEW

## (undated) DEVICE — BLADE ES L6IN ELASTOMERIC COAT EXT DURABLE BEND UPTO 90DEG

## (undated) DEVICE — PROTECTOR EYE PT SELF ADH NS OPT GRD LF

## (undated) DEVICE — TOTAL TRAY, DB, 100% SILI FOLEY, 16FR 10: Brand: MEDLINE

## (undated) DEVICE — BLANKET WRM W29.9XL79.1IN UP BODY FORC AIR MISTRAL-AIR

## (undated) DEVICE — HYPODERMIC SAFETY NEEDLE: Brand: MAGELLAN

## (undated) DEVICE — SPONGE,NEURO,1"X1",XR,STRL,LF,10/PK: Brand: MEDLINE

## (undated) DEVICE — SUTURE VCRL SZ 0 L36IN ABSRB UD L36MM CT-1 1/2 CIR J946H

## (undated) DEVICE — DRESSING ADH N ADH 8X35 IN 6X175 IN SFT CLTH MEDIPORE +

## (undated) DEVICE — THE MILL DISPOSABLE - MEDIUM

## (undated) DEVICE — 4.0MM PRECISION ROUND

## (undated) DEVICE — APPLICATOR MEDICATED 26 CC SOLUTION HI LT ORNG CHLORAPREP